# Patient Record
Sex: MALE | Race: WHITE | NOT HISPANIC OR LATINO | URBAN - METROPOLITAN AREA
[De-identification: names, ages, dates, MRNs, and addresses within clinical notes are randomized per-mention and may not be internally consistent; named-entity substitution may affect disease eponyms.]

---

## 2017-01-27 ENCOUNTER — EMERGENCY (EMERGENCY)
Facility: HOSPITAL | Age: 77
LOS: 1 days | Discharge: HOME | End: 2017-01-27
Attending: EMERGENCY MEDICINE | Admitting: EMERGENCY MEDICINE
Payer: MEDICARE

## 2017-01-27 VITALS
RESPIRATION RATE: 18 BRPM | OXYGEN SATURATION: 98 % | SYSTOLIC BLOOD PRESSURE: 132 MMHG | DIASTOLIC BLOOD PRESSURE: 86 MMHG | HEART RATE: 76 BPM | TEMPERATURE: 99 F

## 2017-01-27 VITALS
SYSTOLIC BLOOD PRESSURE: 139 MMHG | DIASTOLIC BLOOD PRESSURE: 78 MMHG | OXYGEN SATURATION: 98 % | HEART RATE: 69 BPM | RESPIRATION RATE: 16 BRPM

## 2017-01-27 DIAGNOSIS — S01.01XA LACERATION WITHOUT FOREIGN BODY OF SCALP, INITIAL ENCOUNTER: ICD-10-CM

## 2017-01-27 LAB
ALBUMIN SERPL ELPH-MCNC: 4.4 G/DL — SIGNIFICANT CHANGE UP (ref 3.3–5)
ALP SERPL-CCNC: 108 U/L — SIGNIFICANT CHANGE UP (ref 40–120)
ALT FLD-CCNC: 28 U/L RC — SIGNIFICANT CHANGE UP (ref 10–45)
ANION GAP SERPL CALC-SCNC: 10 MMOL/L — SIGNIFICANT CHANGE UP (ref 5–17)
ANION GAP SERPL CALC-SCNC: 16 MMOL/L — SIGNIFICANT CHANGE UP (ref 5–17)
APPEARANCE UR: CLEAR — SIGNIFICANT CHANGE UP
APTT BLD: 27.7 SEC — SIGNIFICANT CHANGE UP (ref 27.5–37.4)
AST SERPL-CCNC: 56 U/L — HIGH (ref 10–40)
BASOPHILS # BLD AUTO: 0 K/UL — SIGNIFICANT CHANGE UP (ref 0–0.2)
BASOPHILS NFR BLD AUTO: 0.5 % — SIGNIFICANT CHANGE UP (ref 0–2)
BILIRUB SERPL-MCNC: 0.6 MG/DL — SIGNIFICANT CHANGE UP (ref 0.2–1.2)
BILIRUB UR-MCNC: NEGATIVE — SIGNIFICANT CHANGE UP
BUN SERPL-MCNC: 15 MG/DL — SIGNIFICANT CHANGE UP (ref 7–23)
BUN SERPL-MCNC: 15 MG/DL — SIGNIFICANT CHANGE UP (ref 7–23)
CALCIUM SERPL-MCNC: 9 MG/DL — SIGNIFICANT CHANGE UP (ref 8.4–10.5)
CALCIUM SERPL-MCNC: 9.5 MG/DL — SIGNIFICANT CHANGE UP (ref 8.4–10.5)
CHLORIDE SERPL-SCNC: 103 MMOL/L — SIGNIFICANT CHANGE UP (ref 96–108)
CHLORIDE SERPL-SCNC: 106 MMOL/L — SIGNIFICANT CHANGE UP (ref 96–108)
CO2 SERPL-SCNC: 25 MMOL/L — SIGNIFICANT CHANGE UP (ref 22–31)
CO2 SERPL-SCNC: 29 MMOL/L — SIGNIFICANT CHANGE UP (ref 22–31)
COLOR SPEC: YELLOW — SIGNIFICANT CHANGE UP
CREAT SERPL-MCNC: 0.79 MG/DL — SIGNIFICANT CHANGE UP (ref 0.5–1.3)
CREAT SERPL-MCNC: 0.83 MG/DL — SIGNIFICANT CHANGE UP (ref 0.5–1.3)
DIFF PNL FLD: NEGATIVE — SIGNIFICANT CHANGE UP
EOSINOPHIL # BLD AUTO: 0.1 K/UL — SIGNIFICANT CHANGE UP (ref 0–0.5)
EOSINOPHIL NFR BLD AUTO: 1.1 % — SIGNIFICANT CHANGE UP (ref 0–6)
GAS PNL BLDV: SIGNIFICANT CHANGE UP
GLUCOSE SERPL-MCNC: 104 MG/DL — HIGH (ref 70–99)
GLUCOSE SERPL-MCNC: 72 MG/DL — SIGNIFICANT CHANGE UP (ref 70–99)
GLUCOSE UR QL: NEGATIVE — SIGNIFICANT CHANGE UP
HCT VFR BLD CALC: 42.4 % — SIGNIFICANT CHANGE UP (ref 39–50)
HGB BLD-MCNC: 14.4 G/DL — SIGNIFICANT CHANGE UP (ref 13–17)
INR BLD: 1.05 RATIO — SIGNIFICANT CHANGE UP (ref 0.88–1.16)
KETONES UR-MCNC: ABNORMAL
LEUKOCYTE ESTERASE UR-ACNC: NEGATIVE — SIGNIFICANT CHANGE UP
LYMPHOCYTES # BLD AUTO: 1.8 K/UL — SIGNIFICANT CHANGE UP (ref 1–3.3)
LYMPHOCYTES # BLD AUTO: 16.4 % — SIGNIFICANT CHANGE UP (ref 13–44)
MCHC RBC-ENTMCNC: 31.3 PG — SIGNIFICANT CHANGE UP (ref 27–34)
MCHC RBC-ENTMCNC: 33.9 GM/DL — SIGNIFICANT CHANGE UP (ref 32–36)
MCV RBC AUTO: 92.1 FL — SIGNIFICANT CHANGE UP (ref 80–100)
MONOCYTES # BLD AUTO: 0.8 K/UL — SIGNIFICANT CHANGE UP (ref 0–0.9)
MONOCYTES NFR BLD AUTO: 7.4 % — SIGNIFICANT CHANGE UP (ref 2–14)
NEUTROPHILS # BLD AUTO: 8 K/UL — HIGH (ref 1.8–7.4)
NEUTROPHILS NFR BLD AUTO: 74.6 % — SIGNIFICANT CHANGE UP (ref 43–77)
NITRITE UR-MCNC: NEGATIVE — SIGNIFICANT CHANGE UP
PH UR: 5.5 — SIGNIFICANT CHANGE UP (ref 4.8–8)
PLATELET # BLD AUTO: 165 K/UL — SIGNIFICANT CHANGE UP (ref 150–400)
POTASSIUM SERPL-MCNC: 3.5 MMOL/L — SIGNIFICANT CHANGE UP (ref 3.5–5.3)
POTASSIUM SERPL-MCNC: 5.5 MMOL/L — HIGH (ref 3.5–5.3)
POTASSIUM SERPL-SCNC: 3.5 MMOL/L — SIGNIFICANT CHANGE UP (ref 3.5–5.3)
POTASSIUM SERPL-SCNC: 5.5 MMOL/L — HIGH (ref 3.5–5.3)
PROT SERPL-MCNC: 7.5 G/DL — SIGNIFICANT CHANGE UP (ref 6–8.3)
PROT UR-MCNC: SIGNIFICANT CHANGE UP
PROTHROM AB SERPL-ACNC: 11.3 SEC — SIGNIFICANT CHANGE UP (ref 10–13.1)
RBC # BLD: 4.61 M/UL — SIGNIFICANT CHANGE UP (ref 4.2–5.8)
RBC # FLD: 12.5 % — SIGNIFICANT CHANGE UP (ref 10.3–14.5)
RBC CASTS # UR COMP ASSIST: SIGNIFICANT CHANGE UP /HPF (ref 0–2)
SODIUM SERPL-SCNC: 144 MMOL/L — SIGNIFICANT CHANGE UP (ref 135–145)
SODIUM SERPL-SCNC: 145 MMOL/L — SIGNIFICANT CHANGE UP (ref 135–145)
SP GR SPEC: 1.03 — HIGH (ref 1.01–1.02)
UROBILINOGEN FLD QL: NEGATIVE — SIGNIFICANT CHANGE UP
WBC # BLD: 10.7 K/UL — HIGH (ref 3.8–10.5)
WBC # FLD AUTO: 10.7 K/UL — HIGH (ref 3.8–10.5)
WBC UR QL: SIGNIFICANT CHANGE UP /HPF (ref 0–5)

## 2017-01-27 PROCEDURE — 71010: CPT | Mod: 26

## 2017-01-27 PROCEDURE — 70450 CT HEAD/BRAIN W/O DYE: CPT

## 2017-01-27 PROCEDURE — 82947 ASSAY GLUCOSE BLOOD QUANT: CPT

## 2017-01-27 PROCEDURE — 99284 EMERGENCY DEPT VISIT MOD MDM: CPT | Mod: 25

## 2017-01-27 PROCEDURE — 99284 EMERGENCY DEPT VISIT MOD MDM: CPT | Mod: GC

## 2017-01-27 PROCEDURE — 82803 BLOOD GASES ANY COMBINATION: CPT

## 2017-01-27 PROCEDURE — 82330 ASSAY OF CALCIUM: CPT

## 2017-01-27 PROCEDURE — 72170 X-RAY EXAM OF PELVIS: CPT

## 2017-01-27 PROCEDURE — 71045 X-RAY EXAM CHEST 1 VIEW: CPT

## 2017-01-27 PROCEDURE — 70450 CT HEAD/BRAIN W/O DYE: CPT | Mod: 26

## 2017-01-27 PROCEDURE — 72170 X-RAY EXAM OF PELVIS: CPT | Mod: 26

## 2017-01-27 PROCEDURE — 80048 BASIC METABOLIC PNL TOTAL CA: CPT

## 2017-01-27 PROCEDURE — 82435 ASSAY OF BLOOD CHLORIDE: CPT

## 2017-01-27 PROCEDURE — 84295 ASSAY OF SERUM SODIUM: CPT

## 2017-01-27 PROCEDURE — 80053 COMPREHEN METABOLIC PANEL: CPT

## 2017-01-27 PROCEDURE — 85027 COMPLETE CBC AUTOMATED: CPT

## 2017-01-27 PROCEDURE — 81001 URINALYSIS AUTO W/SCOPE: CPT

## 2017-01-27 PROCEDURE — 84132 ASSAY OF SERUM POTASSIUM: CPT

## 2017-01-27 PROCEDURE — 85730 THROMBOPLASTIN TIME PARTIAL: CPT

## 2017-01-27 PROCEDURE — 82550 ASSAY OF CK (CPK): CPT

## 2017-01-27 PROCEDURE — 83605 ASSAY OF LACTIC ACID: CPT

## 2017-01-27 PROCEDURE — 85610 PROTHROMBIN TIME: CPT

## 2017-01-27 PROCEDURE — 82553 CREATINE MB FRACTION: CPT

## 2017-01-27 PROCEDURE — 84484 ASSAY OF TROPONIN QUANT: CPT

## 2017-01-27 PROCEDURE — 85014 HEMATOCRIT: CPT

## 2017-01-27 RX ORDER — TETANUS TOXOID, REDUCED DIPHTHERIA TOXOID AND ACELLULAR PERTUSSIS VACCINE, ADSORBED 5; 2.5; 8; 8; 2.5 [IU]/.5ML; [IU]/.5ML; UG/.5ML; UG/.5ML; UG/.5ML
0.5 SUSPENSION INTRAMUSCULAR ONCE
Qty: 0 | Refills: 0 | Status: COMPLETED | OUTPATIENT
Start: 2017-01-27 | End: 2017-01-27

## 2017-01-27 NOTE — ED PROVIDER NOTE - MEDICAL DECISION MAKING DETAILS
Neal resident: 78 y/o with parkinsons not on AC p/w fall at 10 am this morning - small 1cm laceration on posterior head - no indication for repair as not bleeding - patient was adament about not getting CT as worried about radiation - was able to convince - patient also fell when tying to get up when no one was in room - states he fell onto buttocks - no pain - will CT head, check labs, and reassess social situation as patient is clearly unstable and unable to walk 2/2 to 2 falls today eNal resident: 78 y/o with parkinsons not on AC p/w fall at 10 am this morning - small 1cm laceration on posterior head - no indication for repair as not bleeding - patient was adament about not getting CT as worried about radiation - was able to convince - patient also fell when tying to get up when no one was in room - states he fell onto buttocks - no pain - will CT head, check labs, and reassess social situation as patient is clearly unstable and unable to walk 2/2 to 2 falls today  ATTD: fall with head injury, concern for trauma, check ct head, concern for recent decrease in activity secondary to infection  / electrolyte abnormalities. will check labs, and urine and re eval for dispo.

## 2017-01-27 NOTE — ED ADULT NURSE REASSESSMENT NOTE - NS ED NURSE REASSESS COMMENT FT1
EMS left without report.
Incident report filled out due to patient found on ground in room 36.  Patient was left in red 36 briefly while wife stepped out to get food. RN and MD had already worked up the patient. Pt was educated on the importance of staying in bed and asking for help if he needed to urinate. Patient is A&Ox4. Call bell was near by. Patient is in ED for fall at home. No LOC, no obvious injuries. MD aware. Pt found sitting on the floor awake and alert. Then patient was taken to CT.    Incident report given to ED manager.

## 2017-01-27 NOTE — ED PROVIDER NOTE - CRANIAL NERVE AND PUPILLARY EXAM
essential resting tremor of upper extremities - mild dysmetria of both upper exdtremities - sensation intact over all 4 extremities/cranial nerves 2-12 intact

## 2017-01-27 NOTE — ED PROVIDER NOTE - OBJECTIVE STATEMENT
76 y/o male with PMH of of Parkinsons. Per patient, patient fell in the bedroom this morning at 10 an. Slipped and fell onto wooden floor and hit head. Was unable to get up on his own. No LOC. Went to PT today who would not allow patient to participate unless he was cleared by an MD. Currently feels well. Denies any HA, vision changes, Cp, SOB, N/V/D, numbness, paresthesias. Per wife, patient has had an abrupt change in his activity over past few days - has had worsening balances. Patient agrees that he has been unsteady, and feels that his legs are like "lead," and this morning was the worst hes ever experienced.   PMD: Dr. Pa Wells

## 2017-01-27 NOTE — ED ADULT NURSE NOTE - OBJECTIVE STATEMENT
77 yr old male brought in by ambulance from PT s/p unwitnessed fall at home (but heard by wife) prior to going to PT. Patient states he has Parkinson's- denies taking meds for it. States he has been feeling weaker the past few days; walks with a cane. Patient had a fall at home, No LOC, small abrasion noted back of head. Unknown if Tetanus is up-to-date. Patient denies headache, dizziness, chest pain, sob, cough, fevers, chills, n/v/d. Patient remains on a regular diet at home and denies decrease in appetite. No hematuria or dysuria/burning with urination. +PERRL, clear speech. Patient was able to get back up with assistance from wife. 77 yr old male brought in by ambulance from PT s/p unwitnessed fall at home (but heard by wife) prior to going to PT. Patient states he has Parkinson's- denies taking meds for it. States he has been feeling weaker the past few days; walks with a cane. Patient had a fall at home, No LOC, small abrasion noted back of head. Unknown if Tetanus is up-to-date. Patient denies headache, dizziness, chest pain, sob, cough, fevers, chills, n/v/d. Patient remains on a regular diet at home and denies decrease in appetite. No hematuria or dysuria/burning with urination. +PERRL, clear speech. Patient was able to get back up with assistance from wife. Patient also states "I have felt this way before because of my Parkinson's".

## 2017-01-27 NOTE — ED PROVIDER NOTE - ATTENDING CONTRIBUTION TO CARE
78 y/o male with pmhx of Parkinsons and, kidney stones, presents for concern of fall earlier today and needs clearance for physical therapy. Per wife and patient, he fell in bedroom this morning at around 10 am  kavita sliding on hardwood floor. mild head trauma. no LOC. Denies any HA, vision changes. Had no pre or post chest pain. no shortness of breath. no dizziness. Wife concerned that he had less energy than usual.   PMD: Dr. Pa Wells  Gen. no acute distress, Non toxic   HEENT: EOMI, mmm, pupils 3 mm reactive to light b/l. small area of ecchymosis on occipital area of head. no active bleeding. superficial laceration. no step off. no racoon no elkins sign. no hemotympanum. supple neck / c spine.   Lungs: CTAB/L no C/ W /R   CVS: S1S2   Abd; Soft non tender, non distended   Ext: no edema   Neuro Awake, alert, oriented to person / place. + tremor. mild dysmetria of both upper exdtremities - sensation intact over all 4 extremities

## 2017-01-27 NOTE — ED PROVIDER NOTE - PROGRESS NOTE DETAILS
patient fell out of bed trying to get into the bathroom - unwitnessed - fell onto buttocks - states he did not hit head - was instructed not to get up as wife went to get patient food by nursing - reports no pain neuro to see

## 2017-06-19 ENCOUNTER — EMERGENCY (EMERGENCY)
Facility: HOSPITAL | Age: 77
LOS: 1 days | Discharge: LEFT BEFORE DISCHARGE | End: 2017-06-19
Attending: EMERGENCY MEDICINE | Admitting: EMERGENCY MEDICINE
Payer: MEDICARE

## 2017-06-19 VITALS
TEMPERATURE: 99 F | HEART RATE: 75 BPM | DIASTOLIC BLOOD PRESSURE: 77 MMHG | RESPIRATION RATE: 20 BRPM | OXYGEN SATURATION: 98 % | SYSTOLIC BLOOD PRESSURE: 136 MMHG

## 2017-06-19 VITALS — HEART RATE: 92 BPM | SYSTOLIC BLOOD PRESSURE: 135 MMHG | DIASTOLIC BLOOD PRESSURE: 73 MMHG

## 2017-06-19 LAB
ALBUMIN SERPL ELPH-MCNC: 3.9 G/DL — SIGNIFICANT CHANGE UP (ref 3.3–5)
ALP SERPL-CCNC: 93 U/L — SIGNIFICANT CHANGE UP (ref 40–120)
ALT FLD-CCNC: 13 U/L RC — SIGNIFICANT CHANGE UP (ref 10–45)
ANION GAP SERPL CALC-SCNC: 12 MMOL/L — SIGNIFICANT CHANGE UP (ref 5–17)
APPEARANCE UR: CLEAR — SIGNIFICANT CHANGE UP
AST SERPL-CCNC: 21 U/L — SIGNIFICANT CHANGE UP (ref 10–40)
BASOPHILS # BLD AUTO: 0 K/UL — SIGNIFICANT CHANGE UP (ref 0–0.2)
BASOPHILS NFR BLD AUTO: 0.1 % — SIGNIFICANT CHANGE UP (ref 0–2)
BILIRUB SERPL-MCNC: 0.6 MG/DL — SIGNIFICANT CHANGE UP (ref 0.2–1.2)
BILIRUB UR-MCNC: NEGATIVE — SIGNIFICANT CHANGE UP
BUN SERPL-MCNC: 14 MG/DL — SIGNIFICANT CHANGE UP (ref 7–23)
CALCIUM SERPL-MCNC: 9.2 MG/DL — SIGNIFICANT CHANGE UP (ref 8.4–10.5)
CHLORIDE SERPL-SCNC: 104 MMOL/L — SIGNIFICANT CHANGE UP (ref 96–108)
CO2 SERPL-SCNC: 28 MMOL/L — SIGNIFICANT CHANGE UP (ref 22–31)
COLOR SPEC: YELLOW — SIGNIFICANT CHANGE UP
CREAT SERPL-MCNC: 0.94 MG/DL — SIGNIFICANT CHANGE UP (ref 0.5–1.3)
DIFF PNL FLD: NEGATIVE — SIGNIFICANT CHANGE UP
EOSINOPHIL # BLD AUTO: 0.1 K/UL — SIGNIFICANT CHANGE UP (ref 0–0.5)
EOSINOPHIL NFR BLD AUTO: 0.7 % — SIGNIFICANT CHANGE UP (ref 0–6)
GAS PNL BLDV: SIGNIFICANT CHANGE UP
GLUCOSE SERPL-MCNC: 111 MG/DL — HIGH (ref 70–99)
GLUCOSE UR QL: NEGATIVE — SIGNIFICANT CHANGE UP
HCT VFR BLD CALC: 40.1 % — SIGNIFICANT CHANGE UP (ref 39–50)
HGB BLD-MCNC: 13.7 G/DL — SIGNIFICANT CHANGE UP (ref 13–17)
INR BLD: 1.08 RATIO — SIGNIFICANT CHANGE UP (ref 0.88–1.16)
KETONES UR-MCNC: NEGATIVE — SIGNIFICANT CHANGE UP
LEUKOCYTE ESTERASE UR-ACNC: NEGATIVE — SIGNIFICANT CHANGE UP
LYMPHOCYTES # BLD AUTO: 1 K/UL — SIGNIFICANT CHANGE UP (ref 1–3.3)
LYMPHOCYTES # BLD AUTO: 12.5 % — LOW (ref 13–44)
MCHC RBC-ENTMCNC: 32.2 PG — SIGNIFICANT CHANGE UP (ref 27–34)
MCHC RBC-ENTMCNC: 34.2 GM/DL — SIGNIFICANT CHANGE UP (ref 32–36)
MCV RBC AUTO: 94.1 FL — SIGNIFICANT CHANGE UP (ref 80–100)
MONOCYTES # BLD AUTO: 0.5 K/UL — SIGNIFICANT CHANGE UP (ref 0–0.9)
MONOCYTES NFR BLD AUTO: 6.1 % — SIGNIFICANT CHANGE UP (ref 2–14)
NEUTROPHILS # BLD AUTO: 6.5 K/UL — SIGNIFICANT CHANGE UP (ref 1.8–7.4)
NEUTROPHILS NFR BLD AUTO: 80.7 % — HIGH (ref 43–77)
NITRITE UR-MCNC: NEGATIVE — SIGNIFICANT CHANGE UP
PH UR: 5.5 — SIGNIFICANT CHANGE UP (ref 5–8)
PLATELET # BLD AUTO: 161 K/UL — SIGNIFICANT CHANGE UP (ref 150–400)
POTASSIUM SERPL-MCNC: 4.1 MMOL/L — SIGNIFICANT CHANGE UP (ref 3.5–5.3)
POTASSIUM SERPL-SCNC: 4.1 MMOL/L — SIGNIFICANT CHANGE UP (ref 3.5–5.3)
PROT SERPL-MCNC: 7 G/DL — SIGNIFICANT CHANGE UP (ref 6–8.3)
PROT UR-MCNC: NEGATIVE — SIGNIFICANT CHANGE UP
PROTHROM AB SERPL-ACNC: 11.7 SEC — SIGNIFICANT CHANGE UP (ref 9.8–12.7)
RBC # BLD: 4.26 M/UL — SIGNIFICANT CHANGE UP (ref 4.2–5.8)
RBC # FLD: 11.8 % — SIGNIFICANT CHANGE UP (ref 10.3–14.5)
SODIUM SERPL-SCNC: 144 MMOL/L — SIGNIFICANT CHANGE UP (ref 135–145)
SP GR SPEC: 1.02 — SIGNIFICANT CHANGE UP (ref 1.01–1.02)
UROBILINOGEN FLD QL: NEGATIVE — SIGNIFICANT CHANGE UP
WBC # BLD: 8.1 K/UL — SIGNIFICANT CHANGE UP (ref 3.8–10.5)
WBC # FLD AUTO: 8.1 K/UL — SIGNIFICANT CHANGE UP (ref 3.8–10.5)
WBC UR QL: SIGNIFICANT CHANGE UP /HPF (ref 0–5)

## 2017-06-19 PROCEDURE — 99284 EMERGENCY DEPT VISIT MOD MDM: CPT | Mod: 25

## 2017-06-19 PROCEDURE — 82330 ASSAY OF CALCIUM: CPT

## 2017-06-19 PROCEDURE — 82435 ASSAY OF BLOOD CHLORIDE: CPT

## 2017-06-19 PROCEDURE — 82553 CREATINE MB FRACTION: CPT

## 2017-06-19 PROCEDURE — 82803 BLOOD GASES ANY COMBINATION: CPT

## 2017-06-19 PROCEDURE — 81001 URINALYSIS AUTO W/SCOPE: CPT

## 2017-06-19 PROCEDURE — 85610 PROTHROMBIN TIME: CPT

## 2017-06-19 PROCEDURE — 80053 COMPREHEN METABOLIC PANEL: CPT

## 2017-06-19 PROCEDURE — 70450 CT HEAD/BRAIN W/O DYE: CPT

## 2017-06-19 PROCEDURE — 99284 EMERGENCY DEPT VISIT MOD MDM: CPT | Mod: GC

## 2017-06-19 PROCEDURE — 70450 CT HEAD/BRAIN W/O DYE: CPT | Mod: 26

## 2017-06-19 PROCEDURE — 72125 CT NECK SPINE W/O DYE: CPT

## 2017-06-19 PROCEDURE — 85014 HEMATOCRIT: CPT

## 2017-06-19 PROCEDURE — 84484 ASSAY OF TROPONIN QUANT: CPT

## 2017-06-19 PROCEDURE — 82947 ASSAY GLUCOSE BLOOD QUANT: CPT

## 2017-06-19 PROCEDURE — 83605 ASSAY OF LACTIC ACID: CPT

## 2017-06-19 PROCEDURE — 82550 ASSAY OF CK (CPK): CPT

## 2017-06-19 PROCEDURE — 84295 ASSAY OF SERUM SODIUM: CPT

## 2017-06-19 PROCEDURE — 72125 CT NECK SPINE W/O DYE: CPT | Mod: 26

## 2017-06-19 PROCEDURE — 85027 COMPLETE CBC AUTOMATED: CPT

## 2017-06-19 PROCEDURE — 71010: CPT | Mod: 26

## 2017-06-19 PROCEDURE — 71045 X-RAY EXAM CHEST 1 VIEW: CPT

## 2017-06-19 PROCEDURE — 84132 ASSAY OF SERUM POTASSIUM: CPT

## 2017-06-19 RX ORDER — TETANUS TOXOID, REDUCED DIPHTHERIA TOXOID AND ACELLULAR PERTUSSIS VACCINE, ADSORBED 5; 2.5; 8; 8; 2.5 [IU]/.5ML; [IU]/.5ML; UG/.5ML; UG/.5ML; UG/.5ML
0.5 SUSPENSION INTRAMUSCULAR ONCE
Qty: 0 | Refills: 0 | Status: COMPLETED | OUTPATIENT
Start: 2017-06-19 | End: 2017-06-19

## 2017-06-19 NOTE — ED ADULT NURSE NOTE - OBJECTIVE STATEMENT
1040 77 yr old WM brought to ER via ambulance on stretcher for further eval and tx or syncopal episode. c/o feeling dizzy and not feeling well after eating breakfast. Wife heard a thump in other room and found  on floor in bedroom. Hx of parkinsons with Stem Cell tx in past. Usually has difficulty walking and is slightly confused. More confused today after episode of syncope. Denies chest pain, palp or SOB. color pink. skin W&D. Laceration near right eyebrow. minimal bleeding at present

## 2017-06-19 NOTE — ED PROVIDER NOTE - OBJECTIVE STATEMENT
77 year old male, past medical history parkinsons, who presents to the ED for fall about 1 hour prior to arrival. had unwitnessed fall, was down for about 2 minutes as per wife who heard patient fall. Patient was snoring at that time. Regained consciousness as now back to his baseline. No chest pain, shortness of breath, dizziness, lightheadedness. No recent fevers, chills, nausea, vomiting, diarrhea, constipation. No cough, congestion. Has increasing weakness and fatigue. Does not take medications. Has laceration over right eyebrow. No sick contacts or recent travel.     Dr. Shravan Wells ([facility])

## 2017-06-19 NOTE — ED PROVIDER NOTE - PLAN OF CARE
1) Drink plenty of fluids   2) Take Tylenol 325mg tablet, take 2 tablets every 6-8 hours as needed for pain   3) Keep laceration clean and dry for 24 hours, you can wash wound with soap and water after 24 hours, do not swim   4) Please follow up with your primary medical doctor in 1-2 days for reevaluation. If you do not have pmd please call the general medicine clinic for an appointment at [phone].   5) You were given a copy of your results, please show them to your doctor for review.   6) Return to the ED for worsening pain, headache, nausea, vomiting, fever greater than 100.4, redness around wound, pus coming from wound, chest pain, shortness of breath, abdominal pain, faint again, fall, or if you have any other new, worsening, or concerning symptoms.

## 2017-06-19 NOTE — ED ADULT NURSE NOTE - ED STAT RN HANDOFF DETAILS
hand off given to oncoming RNs Baldomero Puente and Shannon Quick. Awaiting urine sample for UA. Laceration was cleansed and sutured earlier by . Pt awake and alert. Ate lunch. wife at [facility]

## 2017-06-19 NOTE — ED PROVIDER NOTE - PROGRESS NOTE DETAILS
Offered admission for syncope, wife and patient declined admission, will see primary medical doctor in 1-2 days. Offered advanced testing, monitoring labs. patient and wife understand risks and benfits, not limited to repeat syncope, cardiac disease and death. The patient is leaving against my medical advice. I have informed the patient and wife about the risks, benefits, and alternatives to the evaluation and treatment of their condition. The patient and wife are aware of the potential for self harm by this decision including injury, permanent disability, or even death. The patient reports understanding of these issues and has the capacity and insight to make important medical decisions. The necessity to follow up with PMD/Clinic/follow up provided within 2-3 days was explained. The patient understands that this decision to go against medical advice can be changed at any time and the patient can return for re-evaluation and further treatment with any changes in condition or concerns.  The patient understands all the reasons to return to the Emergency Department, including any concerns at all, the patient reports understanding of above with capacity and insight.

## 2017-06-19 NOTE — ED PROVIDER NOTE - ATTENDING CONTRIBUTION TO CARE
pt is a 78 y/o male with syncopal episode with r eyebrow laceration noted, from of hips and arms, no other signs of trauma. no h/o syncope likely admissoin, suture, ct head/cspine.

## 2017-06-19 NOTE — ED PROVIDER NOTE - CARE PLAN
Principal Discharge DX:	Syncope  Instructions for follow-up, activity and diet:	1) Drink plenty of fluids   2) Take Tylenol 325mg tablet, take 2 tablets every 6-8 hours as needed for pain   3) Keep laceration clean and dry for 24 hours, you can wash wound with soap and water after 24 hours, do not swim   4) Please follow up with your primary medical doctor in 1-2 days for reevaluation. If you do not have pmd please call the general medicine clinic for an appointment at [phone].   5) You were given a copy of your results, please show them to your doctor for review.   6) Return to the ED for worsening pain, headache, nausea, vomiting, fever greater than 100.4, redness around wound, pus coming from wound, chest pain, shortness of breath, abdominal pain, faint again, fall, or if you have any other new, worsening, or concerning symptoms.

## 2017-06-20 ENCOUNTER — INPATIENT (INPATIENT)
Facility: HOSPITAL | Age: 77
LOS: 3 days | Discharge: NURSING, REHAB OR LONG-TERM CARE | End: 2017-06-24
Attending: HOSPITALIST | Admitting: HOSPITALIST
Payer: MEDICARE

## 2017-06-20 VITALS
RESPIRATION RATE: 16 BRPM | OXYGEN SATURATION: 98 % | DIASTOLIC BLOOD PRESSURE: 68 MMHG | SYSTOLIC BLOOD PRESSURE: 119 MMHG | TEMPERATURE: 98 F | HEART RATE: 70 BPM

## 2017-06-20 DIAGNOSIS — G91.2 (IDIOPATHIC) NORMAL PRESSURE HYDROCEPHALUS: ICD-10-CM

## 2017-06-20 DIAGNOSIS — Z29.9 ENCOUNTER FOR PROPHYLACTIC MEASURES, UNSPECIFIED: ICD-10-CM

## 2017-06-20 DIAGNOSIS — R41.82 ALTERED MENTAL STATUS, UNSPECIFIED: ICD-10-CM

## 2017-06-20 DIAGNOSIS — G20 PARKINSON'S DISEASE: ICD-10-CM

## 2017-06-20 DIAGNOSIS — R74.0 NONSPECIFIC ELEVATION OF LEVELS OF TRANSAMINASE AND LACTIC ACID DEHYDROGENASE [LDH]: ICD-10-CM

## 2017-06-20 LAB
ALBUMIN SERPL ELPH-MCNC: 4 G/DL — SIGNIFICANT CHANGE UP (ref 3.3–5)
ALP SERPL-CCNC: 98 U/L — SIGNIFICANT CHANGE UP (ref 40–120)
ALT FLD-CCNC: 29 U/L — SIGNIFICANT CHANGE UP (ref 4–41)
AST SERPL-CCNC: 113 U/L — HIGH (ref 4–40)
BILIRUB SERPL-MCNC: 1 MG/DL — SIGNIFICANT CHANGE UP (ref 0.2–1.2)
BUN SERPL-MCNC: 10 MG/DL — SIGNIFICANT CHANGE UP (ref 7–23)
CALCIUM SERPL-MCNC: 9.3 MG/DL — SIGNIFICANT CHANGE UP (ref 8.4–10.5)
CHLORIDE SERPL-SCNC: 104 MMOL/L — SIGNIFICANT CHANGE UP (ref 98–107)
CLARITY CSF: CLEAR — SIGNIFICANT CHANGE UP
CO2 SERPL-SCNC: 27 MMOL/L — SIGNIFICANT CHANGE UP (ref 22–31)
COLOR CSF: COLORLESS — SIGNIFICANT CHANGE UP
CREAT SERPL-MCNC: 0.92 MG/DL — SIGNIFICANT CHANGE UP (ref 0.5–1.3)
GLUCOSE CSF-MCNC: 59 MG/DL — SIGNIFICANT CHANGE UP (ref 40–70)
GLUCOSE SERPL-MCNC: 86 MG/DL — SIGNIFICANT CHANGE UP (ref 70–99)
GRAM STN CSF: SIGNIFICANT CHANGE UP
HCT VFR BLD CALC: 40.4 % — SIGNIFICANT CHANGE UP (ref 39–50)
HGB BLD-MCNC: 13.5 G/DL — SIGNIFICANT CHANGE UP (ref 13–17)
LYMPHOCYTES # CSF: 28 % — SIGNIFICANT CHANGE UP
MCHC RBC-ENTMCNC: 30.7 PG — SIGNIFICANT CHANGE UP (ref 27–34)
MCHC RBC-ENTMCNC: 33.4 % — SIGNIFICANT CHANGE UP (ref 32–36)
MCV RBC AUTO: 91.8 FL — SIGNIFICANT CHANGE UP (ref 80–100)
MONOCYTES # CSF: 64 % — SIGNIFICANT CHANGE UP
NEUTS SEG NFR CSF MANUAL: 8 % — SIGNIFICANT CHANGE UP
NRBC NFR CSF: 2 CELL/UL — SIGNIFICANT CHANGE UP (ref 0–5)
PLATELET # BLD AUTO: 177 K/UL — SIGNIFICANT CHANGE UP (ref 150–400)
PMV BLD: 11.6 FL — SIGNIFICANT CHANGE UP (ref 7–13)
POTASSIUM SERPL-MCNC: 3.9 MMOL/L — SIGNIFICANT CHANGE UP (ref 3.5–5.3)
POTASSIUM SERPL-SCNC: 3.9 MMOL/L — SIGNIFICANT CHANGE UP (ref 3.5–5.3)
PROT CSF-MCNC: 71.8 MG/DL — HIGH (ref 15–45)
PROT SERPL-MCNC: 7 G/DL — SIGNIFICANT CHANGE UP (ref 6–8.3)
RBC # BLD: 4.4 M/UL — SIGNIFICANT CHANGE UP (ref 4.2–5.8)
RBC # CSF: 335 CELL/UL — HIGH (ref 0–0)
RBC # FLD: 13 % — SIGNIFICANT CHANGE UP (ref 10.3–14.5)
SODIUM SERPL-SCNC: 145 MMOL/L — SIGNIFICANT CHANGE UP (ref 135–145)
SPECIMEN SOURCE: SIGNIFICANT CHANGE UP
TOTAL CELLS COUNTED, SPINAL FLUID: 25 CELLS — SIGNIFICANT CHANGE UP
TROPONIN T SERPL-MCNC: < 0.06 NG/ML — SIGNIFICANT CHANGE UP (ref 0–0.06)
VIT B12 SERPL-MCNC: 2000 PG/ML — HIGH (ref 200–900)
WBC # BLD: 9.42 K/UL — SIGNIFICANT CHANGE UP (ref 3.8–10.5)
WBC # FLD AUTO: 9.42 K/UL — SIGNIFICANT CHANGE UP (ref 3.8–10.5)
XANTHOCHROMIA: SIGNIFICANT CHANGE UP

## 2017-06-20 PROCEDURE — 70450 CT HEAD/BRAIN W/O DYE: CPT | Mod: 26

## 2017-06-20 PROCEDURE — 71010: CPT | Mod: 26

## 2017-06-20 PROCEDURE — 99223 1ST HOSP IP/OBS HIGH 75: CPT | Mod: GC

## 2017-06-20 RX ORDER — ACETAMINOPHEN 500 MG
650 TABLET ORAL EVERY 6 HOURS
Qty: 0 | Refills: 0 | Status: DISCONTINUED | OUTPATIENT
Start: 2017-06-20 | End: 2017-06-24

## 2017-06-20 RX ORDER — SODIUM CHLORIDE 9 MG/ML
1000 INJECTION, SOLUTION INTRAVENOUS
Qty: 0 | Refills: 0 | Status: DISCONTINUED | OUTPATIENT
Start: 2017-06-20 | End: 2017-06-22

## 2017-06-20 NOTE — PATIENT PROFILE ADULT. - --DESCRIBE SURGICAL SITE
Right temporal area 6 stitches. Steri strips dry and intact Right eyebrow 6 stitches. Steri strips dry and intact

## 2017-06-20 NOTE — H&P ADULT - NSHPREVIEWOFSYSTEMS_GEN_ALL_CORE
CONSTITUTIONAL: +weakness. No fevers or chills  EYES/ENT: No visual changes;  No vertigo or throat pain   NECK: No pain or stiffness  RESPIRATORY: No cough, wheezing, hemoptysis; No shortness of breath  CARDIOVASCULAR: No chest pain or palpitations. + syncope and fall with LOC yesterday  GASTROINTESTINAL: No abdominal or epigastric pain. No nausea, vomiting, or hematemesis; No diarrhea or constipation. No melena or hematochezia.  GENITOURINARY: No dysuria, frequency or hematuria  NEUROLOGICAL: No numbness or weakness  SKIN: No itching, burning, rashes, or lesions   All other review of systems is negative unless indicated above.

## 2017-06-20 NOTE — H&P ADULT - HISTORY OF PRESENT ILLNESS
77M [facility] Parkinson's disease (s/p stem cell transplant currently in an FDA trial) presented with agitation, visual hallucinations, urinary incontinence and a fall. Of note, patient was seen at [facility] on 6/19/2017 for syncope, offered admission for syncope; however, left AMA. When patient was at home overnight he was having urinary incontinence, agitation, and was unable to walk.  Patient was noted to be agitated overnight with visual hallucinations, and generalized weakness.  Wife noted that she was unable to move him this morning. Family called neurologist who referred the patient to the ER for a lumbar puncture and evaluation by neurology and neurosurgery.       In ED T: 100.1  BP: 119/68  HR: 70  RR: 16  SpO2: 98%RA. Patient provided CThead demonstrating no intracranial derangements. BCx taken and Neurology consulted, performed LP with 6cc removed, opening pressure noted to be [id number].

## 2017-06-20 NOTE — H&P ADULT - PROBLEM SELECTOR PLAN 4
Patient s/p LP. Will hold of on DVT p/px with pharmaceuticals for now. SCD's until AM  - Fall risk precautions  - Aspiration risk precautions

## 2017-06-20 NOTE — H&P ADULT - PROBLEM SELECTOR PLAN 1
Patient with waxing and waning mental status typified by visual hallucinations, unsteady gait, urinary incontinence c/w NPH vs infection vs worsening parkinsons dementia  - s/p LP s/p 6cc's CSF removed with normal opening pressure  - f/u CSF cultures, BCx, check RVP  - No Abx for now  - trend fever curve  - LR at 100cc/hr standing for fluid maintainence  - f/u Neurology recommendations Patient with waxing and waning mental status typified by visual hallucinations, unsteady gait, urinary incontinence c/w NPH vs infection vs worsening parkinson's dementia  - s/p LP s/p 6cc's CSF removed with normal opening pressure  - f/u CSF cultures, BCx, check RVP  - No Abx for now  - trend fever curve  - LR at 100cc/hr standing for fluid maintenance  - f/u Neurology recommendations

## 2017-06-20 NOTE — ED ADULT NURSE REASSESSMENT NOTE - NS ED NURSE REASSESS COMMENT FT1
night shift - pt resting comfortably in bed, denies pain/discomfort at the present time.  Pt.'s wife remains at the bedside.  Pt. aaox2 at the present time, as per wife pt. becomes increasingly confused when he is tired/fatigued.  Vital signs stable. Pt. assigned a bed, awaiting to give report to the floor.

## 2017-06-20 NOTE — H&P ADULT - NSHPLABSRESULTS_GEN_ALL_CORE
LABS:                          13.5   9.42  )-----------( 177      ( 20 Jun 2017 18:10 )             40.4       06-20    145  |  104  |  10  ----------------------------<  86  3.9   |  27  |  0.92    Ca    9.3      20 Jun 2017 18:10    TPro  7.0  /  Alb  4.0  /  TBili  1.0  /  DBili  x   /  AST  113<H>  /  ALT  29  /  AlkPhos  98  06-20      Creatine Kinase, Serum (06.19.17 @ 11:09)    Creatine Kinase, Serum: 109 U/L  Troponin T, Serum (06.20.17 @ 18:10)    Troponin T, Serum: < 0.06: INCLUDES THE 99th PERCENTILE OF A HEALTHY  POPULATION AT A  METHOD C.V. OF 10% OR LESS.    TROPONIN T IS MEASURED BY THE ROCHE ELECSYS ECLIA METHOD. ng/mL      Spinal Fluid Differential (06.20.17 @ 19:48)    Seg Count, CSF: 8 %    Lymphocyte Count, CSF: 28 %  Cerebrospinal Fluid Cell Count-1 (06.20.17 @ 19:40)    CSF Clarity: CLEAR    CSF Color: COLORLESS    Total Nucleated Cell Count, CSF: 1 cell/uL  Protein, CSF (06.20.17 @ 19:48)    Protein, CSF: 71.8 mg/dL  Glucose, CSF (06.20.17 @ 19:48)    Glucose, CSF: 59 mg/dL      IMAGING:    CThead:   IMPRESSION:  No acute intracranial hemorrhage or midline shift. Prominent ventricular   size out of proportion to cerebral atrophy, with effacement of sulci near   the vertex suggestive of normal pressure hydrocephalus, unchanged from   prior. LABS:                          13.5   9.42  )-----------( 177      ( 20 Jun 2017 18:10 )             40.4       06-20    145  |  104  |  10  ----------------------------<  86  3.9   |  27  |  0.92    Ca    9.3      20 Jun 2017 18:10    TPro  7.0  /  Alb  4.0  /  TBili  1.0  /  DBili  x   /  AST  113<H>  /  ALT  29  /  AlkPhos  98  06-20      Creatine Kinase, Serum (06.19.17 @ 11:09)    Creatine Kinase, Serum: 109 U/L  Troponin T, Serum (06.20.17 @ 18:10)    Troponin T, Serum: < 0.06: INCLUDES THE 99th PERCENTILE OF A HEALTHY  POPULATION AT A  METHOD C.V. OF 10% OR LESS.    TROPONIN T IS MEASURED BY THE ROCHE ELECSYS ECLIA METHOD. ng/mL      Spinal Fluid Differential (06.20.17 @ 19:48)    Seg Count, CSF: 8 %    Lymphocyte Count, CSF: 28 %  Cerebrospinal Fluid Cell Count-1 (06.20.17 @ 19:40)    CSF Clarity: CLEAR    CSF Color: COLORLESS    Total Nucleated Cell Count, CSF: 1 cell/uL  Protein, CSF (06.20.17 @ 19:48)    Protein, CSF: 71.8 mg/dL  Glucose, CSF (06.20.17 @ 19:48)    Glucose, CSF: 59 mg/dL      IMAGING:    CThead:   IMPRESSION:  No acute intracranial hemorrhage or midline shift. Prominent ventricular   size out of proportion to cerebral atrophy, with effacement of sulci near   the vertex suggestive of normal pressure hydrocephalus, unchanged from   prior.    ECG: NSR at 70 BPM, LAE, LVH QTc 483 ms

## 2017-06-20 NOTE — ED PROVIDER NOTE - ATTENDING CONTRIBUTION TO CARE
DR Bloch- Patient examined, new onset confusion, unsteady walking and incontinence, Started yesterday, fell, seen at [place], signed out AMA, unable to walk, has Parkinsons with stem cell transplant 1 month ago.  alert flat affect, sutured wound right temple,  temp 100.1 rectal2-12 intact, heart sounds nml, mild rigidity, no tremor, motor 5/5. sensation intact. confused, doesn't remember episode yesterday, think its November.  CT yesterday showed NPH. Discussed with Dr Calderon , Neuro consult  LP,

## 2017-06-20 NOTE — H&P ADULT - NSHPPHYSICALEXAM_GEN_ALL_CORE
General: WN/WD NAD. Speaks in a soft voice.  Neurology: A&Ox3, nonfocal, ROBLES x 4  Eyes: PERRLA/ EOMI, Gross vision intact  ENT/Neck: Neck supple, trachea midline, No JVD, Gross hearing intact  Respiratory: CTA B/L, No wheezing, rales, rhonchi  CV: RRR, +S1/S2, -S3/S4. + 2/6 systolic murmur. No rubs or gallops  Abdominal: Soft, NT, ND +BS, No HSM  MSK: 5/5 strength UE/LE bilaterally. 3+ reflexes UE/LE bilaterally  Extremities: No edema, 2+ peripheral pulses  Skin: No Rashes, Hematoma, Ecchymosis

## 2017-06-20 NOTE — ED ADULT NURSE NOTE - OBJECTIVE STATEMENT
Patient brought to ER by family for increased change in mental status. Pt was at [facility] yesterday and left after tests but was noted to be incontinent and increased weakness during the night. Pt here for further testing and evaluation. IVL placed and labs drawn and sent.   Pt is in process of spinal tap. Consented and witnessed consent with family at bedside.    SHAINA Real

## 2017-06-20 NOTE — ED PROVIDER NOTE - OBJECTIVE STATEMENT
77 year old male HLD, parkinson's disease s/p stem cell transplant currently in an FDA trial, arrived after fall yesterday.  Patient was seen at [facility] with CT revealing no mass, shift acute bleed, CT neck no acute fracture, and laceration now repaired.  Patient and wife decided to leave after initial tests, however, when patient was at home overnight he was having urinary incontinence, agitation, and was unable to walk.  Patient was noted to be agitated overnight with visual hallucinations, and generalized weakness.  Wife noted that she was unable to move him this morning.      CT reviewed in PACs, noted to have dilated ventricles, reviewed note from Pa Wells noting diffuse weakness.

## 2017-06-20 NOTE — ED PROVIDER NOTE - CARE PLAN
Principal Discharge DX:	NPH (normal pressure hydrocephalus)  Secondary Diagnosis:	Altered mental status

## 2017-06-20 NOTE — CONSULT NOTE ADULT - PROBLEM SELECTOR RECOMMENDATION 9
-LP in ER - check opening pressure - remove 30-50 cc's of fluid and then reassess patient  Would send off CSF to r/o infection - cell count, protein, glucose, viral panel including HSV  -If no improvement with LP then will consider trial of Sinemet or other parkinsons meds  -PT/OT  -Neurochecks  -Urinalysis -LP in ER - check opening pressure - remove 30-50 cc's of fluid and then reassess patient  -MRI brain with and without  Would send off CSF to r/o infection - cell count, protein, glucose, viral panel including HSV  -If no improvement with LP then will consider trial of Sinemet or other parkinsons meds  -PT/OT  -Neurochecks  -Urinalysis -LP in ER - check opening pressure - remove 30-50 cc's of fluid and then reassess patient    Would send off CSF to r/o infection - cell count, protein, glucose, viral panel including HSV  -If no improvement with LP then will consider trial of Sinemet or other parkinsons meds  -PT/OT  -Neurochecks  -Urinalysis

## 2017-06-20 NOTE — H&P ADULT - PROBLEM SELECTOR PLAN 2
Patient with known parkinson's disease. On physical exam patient with 5/5 strength UE/LE bilaterally with 3+ reflexes and cogwheeling rigidity UE bilaterally with syncopal event yesterday that could have occurred in the context of autonomic instability.  - Neurology already consulted, will consider placing patient on sinemet in AM  - Fall precautions  - Aspiration precautions  - No acute management required overnight

## 2017-06-20 NOTE — H&P ADULT - ASSESSMENT
77M [facility] Parkinson's disease (s/p stem cell transplant currently in an FDA trial) presented with agitation, visual hallucinations, urinary incontinence and a fall currently s/p LP r/o NPH vs. underlying infection vs worsening Parkinson's disease.

## 2017-06-20 NOTE — CONSULT NOTE ADULT - ATTENDING COMMENTS
Pt seen and examined.   77 M who was sent by Dr. Frank for r/o NPH. However, previous LP only removed 6 cc of CSF.   Recommend repeat spinal tap and assessment of gait before and after the large volume tap.

## 2017-06-20 NOTE — H&P ADULT - PROBLEM SELECTOR PLAN 3
Patient with isolated AST elevation to 113 in absence of positive CE in the context of being enrolled in FDA approved stem cell clinical trial  - Will trend LFT's for now  - patient w/o abdominal pain, nuria colored stools, or jaundice  - if LFT's continue to increase, can consider RUQ US to further evaluate for hepatobiliary pathology

## 2017-06-20 NOTE — ED ADULT TRIAGE NOTE - CHIEF COMPLAINT QUOTE
Pt fell yesterday, was seen at [facility].  Signed out AMA yesterday.  Pt was confused today, went to MD who sent him here.  CT done at [facility] which was negative.  Sent here for neurology consult.  A+OX3.  Received with #18g SL R wrist

## 2017-06-20 NOTE — ED ADULT NURSE NOTE - CHIEF COMPLAINT QUOTE
Pt fell yesterday, was seen at [place].  Signed out AMA yesterday.  Pt was confused today, went to MD who sent him here.  CT done at [facility] which was negative.  Sent here for neurology consult.  A+OX3.  Received with #18g SL R wrist

## 2017-06-20 NOTE — H&P ADULT - REASON FOR ADMISSION
agitation, visual hallucinations, urinary incontinence agitation, visual hallucinations, urinary incontinence of 1 day in duration

## 2017-06-20 NOTE — H&P ADULT - ATTENDING COMMENTS
Patient was seen and evaluated, agree with above with the following additions    77 y.o. man with history of parkinson's disease on stem cell study now with AMS, ataxia, and urinary incontinence likely secondary to NPH as patient appears to have clinically improved following LP which was done overnight.    - Neurology follow up in AM for confirmation of above diagnosis   - consider repeating LP in AM and if improvement is noted, would consult neurosurgery for possible  shunt   - Consider starting acetazolamide    # parkinson's diease   - Outpatient medication review indicates that patient was prescribed 90 tabs of sinemet  on 3/24/17, therefore would consider restarting if neurology service is in agreement. Patient was seen and evaluated, agree with above with the following additions    77 y.o. man with history of parkinson's disease on stem cell study now with AMS, ataxia, and urinary incontinence likely secondary to NPH as patient appears to have clinically improved following LP which was done overnight.    - Neurology follow up in AM for confirmation of above diagnosis   - consider repeating LP in AM and if improvement is noted, would consult neurosurgery for possible  shunt   - Consider starting acetazolamide    # Metabolic encephalopathy   - As likely secondary to above  - Fall risk precaution    # parkinson's diease   - Outpatient medication review indicates that patient was prescribed 90 tabs of sinemet  on 3/24/17, therefore would consider restarting if neurology service is in agreement.

## 2017-06-20 NOTE — ED PROVIDER NOTE - MEDICAL DECISION MAKING DETAILS
76 yo M a/w new dilated ventricles seen yesterday prior to patient AMA after fall, with agitiation, gait imbalance and incontinence concern for possible nph  - neuro  - cbc, cmp, cth

## 2017-06-21 LAB
ALBUMIN SERPL ELPH-MCNC: 3.5 G/DL — SIGNIFICANT CHANGE UP (ref 3.3–5)
ALP SERPL-CCNC: 93 U/L — SIGNIFICANT CHANGE UP (ref 40–120)
ALT FLD-CCNC: 34 U/L — SIGNIFICANT CHANGE UP (ref 4–41)
AST SERPL-CCNC: 92 U/L — HIGH (ref 4–40)
BILIRUB SERPL-MCNC: 0.8 MG/DL — SIGNIFICANT CHANGE UP (ref 0.2–1.2)
BUN SERPL-MCNC: 12 MG/DL — SIGNIFICANT CHANGE UP (ref 7–23)
CALCIUM SERPL-MCNC: 9 MG/DL — SIGNIFICANT CHANGE UP (ref 8.4–10.5)
CHLORIDE SERPL-SCNC: 104 MMOL/L — SIGNIFICANT CHANGE UP (ref 98–107)
CLARITY CSF: CLEAR — SIGNIFICANT CHANGE UP
CLARITY CSF: CLEAR — SIGNIFICANT CHANGE UP
CO2 SERPL-SCNC: 28 MMOL/L — SIGNIFICANT CHANGE UP (ref 22–31)
COLOR CSF: COLORLESS — SIGNIFICANT CHANGE UP
COLOR CSF: COLORLESS — SIGNIFICANT CHANGE UP
CREAT SERPL-MCNC: 0.81 MG/DL — SIGNIFICANT CHANGE UP (ref 0.5–1.3)
GLUCOSE CSF-MCNC: 66 MG/DL — SIGNIFICANT CHANGE UP (ref 40–70)
GLUCOSE SERPL-MCNC: 79 MG/DL — SIGNIFICANT CHANGE UP (ref 70–99)
GRAM STN CSF: SIGNIFICANT CHANGE UP
HCT VFR BLD CALC: 40.5 % — SIGNIFICANT CHANGE UP (ref 39–50)
HGB BLD-MCNC: 13.3 G/DL — SIGNIFICANT CHANGE UP (ref 13–17)
LYMPHOCYTES # CSF: 34 % — SIGNIFICANT CHANGE UP
LYMPHOCYTES # CSF: 90 % — SIGNIFICANT CHANGE UP
MAGNESIUM SERPL-MCNC: 1.9 MG/DL — SIGNIFICANT CHANGE UP (ref 1.6–2.6)
MCHC RBC-ENTMCNC: 30.1 PG — SIGNIFICANT CHANGE UP (ref 27–34)
MCHC RBC-ENTMCNC: 32.8 % — SIGNIFICANT CHANGE UP (ref 32–36)
MCV RBC AUTO: 91.6 FL — SIGNIFICANT CHANGE UP (ref 80–100)
MONOCYTES # CSF: 10 % — SIGNIFICANT CHANGE UP
MONOCYTES # CSF: 56 % — SIGNIFICANT CHANGE UP
NEUTS SEG NFR CSF MANUAL: 10 % — SIGNIFICANT CHANGE UP
NRBC NFR CSF: 2 CELL/UL — SIGNIFICANT CHANGE UP (ref 0–5)
NRBC NFR CSF: < 1 CELL/UL — SIGNIFICANT CHANGE UP (ref 0–5)
PHOSPHATE SERPL-MCNC: 3.3 MG/DL — SIGNIFICANT CHANGE UP (ref 2.5–4.5)
PLATELET # BLD AUTO: 183 K/UL — SIGNIFICANT CHANGE UP (ref 150–400)
PMV BLD: 11.5 FL — SIGNIFICANT CHANGE UP (ref 7–13)
POTASSIUM SERPL-MCNC: 3.6 MMOL/L — SIGNIFICANT CHANGE UP (ref 3.5–5.3)
POTASSIUM SERPL-SCNC: 3.6 MMOL/L — SIGNIFICANT CHANGE UP (ref 3.5–5.3)
PROT CSF-MCNC: 55.8 MG/DL — HIGH (ref 15–45)
PROT SERPL-MCNC: 6.3 G/DL — SIGNIFICANT CHANGE UP (ref 6–8.3)
RBC # BLD: 4.42 M/UL — SIGNIFICANT CHANGE UP (ref 4.2–5.8)
RBC # CSF: 225 CELL/UL — HIGH (ref 0–0)
RBC # CSF: 375 CELL/UL — HIGH (ref 0–0)
RBC # FLD: 12.8 % — SIGNIFICANT CHANGE UP (ref 10.3–14.5)
SODIUM SERPL-SCNC: 144 MMOL/L — SIGNIFICANT CHANGE UP (ref 135–145)
SPECIMEN SOURCE: SIGNIFICANT CHANGE UP
TOTAL CELLS COUNTED, SPINAL FLUID: 20 CELLS — SIGNIFICANT CHANGE UP
TOTAL CELLS COUNTED, SPINAL FLUID: 50 CELLS — SIGNIFICANT CHANGE UP
WBC # BLD: 10.05 K/UL — SIGNIFICANT CHANGE UP (ref 3.8–10.5)
WBC # FLD AUTO: 10.05 K/UL — SIGNIFICANT CHANGE UP (ref 3.8–10.5)
XANTHOCHROMIA: SIGNIFICANT CHANGE UP
XANTHOCHROMIA: SIGNIFICANT CHANGE UP

## 2017-06-21 PROCEDURE — 99223 1ST HOSP IP/OBS HIGH 75: CPT

## 2017-06-21 PROCEDURE — 99233 SBSQ HOSP IP/OBS HIGH 50: CPT | Mod: GC

## 2017-06-21 RX ORDER — CARBIDOPA AND LEVODOPA 25; 100 MG/1; MG/1
1 TABLET ORAL THREE TIMES A DAY
Qty: 0 | Refills: 0 | Status: DISCONTINUED | OUTPATIENT
Start: 2017-06-21 | End: 2017-06-22

## 2017-06-21 RX ADMIN — CARBIDOPA AND LEVODOPA 1 TABLET(S): 25; 100 TABLET ORAL at 22:29

## 2017-06-21 RX ADMIN — SODIUM CHLORIDE 100 MILLILITER(S): 9 INJECTION, SOLUTION INTRAVENOUS at 00:16

## 2017-06-21 RX ADMIN — SODIUM CHLORIDE 100 MILLILITER(S): 9 INJECTION, SOLUTION INTRAVENOUS at 09:02

## 2017-06-21 NOTE — PROGRESS NOTE ADULT - PROBLEM SELECTOR PLAN 2
Patient with known parkinson's disease. On physical exam patient with 5/5 strength UE/LE bilaterally with 3+ reflexes and cogwheeling rigidity UE bilaterally with syncopal event yesterday that could have occurred in the context of autonomic instability.  - Neurology already consulted  - Fall precautions  - Aspiration precautions  - No acute management required overnight  - Has had prior reaction to sinemet (unclear reaction) per family. Holding off on restarting medication until r/o NPH.

## 2017-06-21 NOTE — PROGRESS NOTE ADULT - PROBLEM SELECTOR PLAN 1
Patient with waxing and waning mental status typified by visual hallucinations, unsteady gait, urinary incontinence c/w NPH vs infection vs worsening parkinson's dementia. CT scan with prominent ventricles out of proportion to cerebral atrophy suggestive of NPH   - s/p LP s/p 6cc's CSF removed with normal opening pressure  - Pending large volume LP to assess NPH  - f/u CSF cultures, BCx, check RVP  - No Abx for now  - trend fever curve  - LR at 100cc/hr standing for fluid maintenance  - f/u Neurology recommendations  - No MRI with/without contrast per neurology

## 2017-06-21 NOTE — PROGRESS NOTE ADULT - SUBJECTIVE AND OBJECTIVE BOX
Medicine Progress/Accept Note:     Patient is a 77y old  Male who presents with a chief complaint of agitation, visual hallucinations, urinary incontinence of 1 day in duration (2017 23:11)    SUBJECTIVE / OVERNIGHT EVENTS:  Patient was admitted to medicine in setting of syncope/fall at home, visual hallucinations, and urinary incontinence. He was noted to have a CT head with ventriculomegaly. This morning he denied any history of seizures or heart disease. He denied fevers/chills, cough, shortness of breath, chest pain, palpitations, dysphagia, cough with eating, nausea or vomiting. He denied diarrhea, headaches or vision loss, and weight loss. He was noted to have paresthesias.       MEDICATIONS  (STANDING):  lactated ringers. 1000milliLiter(s) IV Continuous <Continuous>    MEDICATIONS  (PRN):  acetaminophen   Tablet 650milliGRAM(s) Oral every 6 hours PRN For Temp greater than 38 C (100.4 F)  acetaminophen   Tablet. 650milliGRAM(s) Oral every 6 hours PRN Mild to Moderate Pain (1-6)      Vital Signs Last 24 Hrs  T(C): 36.8, Max: 37.8 (- @ 18:22)  HR: 68 (66 - 70)  BP: 133/78 (113/72 - 133/78)  RR: 18 (16 - 18)  SpO2: 100% (98% - 100%)  Wt(kg): --  CAPILLARY BLOOD GLUCOSE    I&O's Summary      PHYSICAL EXAM:  GENERAL: NAD, well-developed  HEAD:  Bandage above eye, but no bruising  EYES: crusting surrounding eyes bilaterally  NECK: Supple  CHEST/LUNG: Clear to auscultation bilaterally; No wheeze, rales or rhonchi  HEART: Regular rate and rhythm; No murmurs, rubs, or gallops  ABDOMEN: Soft, Nontender, Nondistended; Bowel sounds present, no guarding or rigidity  EXTREMITIES: No edema  PSYCH: AAOx1-2 (name, [place], )  NEUROLOGY: non-focal  SKIN: No rashes or lesions    LABS:                        13.3   10.05 )-----------( 183      ( 2017 06:45 )             40.5     06-21    144  |  104  |  12  ----------------------------<  79  3.6   |  28  |  0.81    Ca    9.0      2017 06:45  Phos  3.3     06-21  Mg     1.9     06-21    TPro  6.3  /  Alb  3.5  /  TBili  0.8  /  DBili  x   /  AST  92<H>  /  ALT  34  /  AlkPhos  93  06-21      CARDIAC MARKERS ( 2017 18:10 )  x     / < 0.06 ng/mL / x     / x     / x          Urinalysis Basic - ( 2017 15:25 )    Color: Yellow / Appearance: Clear / S.024 / pH: x  Gluc: x / Ketone: Negative  / Bili: Negative / Urobili: Negative   Blood: x / Protein: Negative / Nitrite: Negative   Leuk Esterase: Negative / RBC: x / WBC 0-2 /HPF   Sq Epi: x / Non Sq Epi: x / Bacteria: x        RADIOLOGY & ADDITIONAL TESTS:    Imaging Personally Reviewed:    Consultant(s) Notes Reviewed:      Care Discussed with Consultants/Other Providers:

## 2017-06-21 NOTE — PROGRESS NOTE ADULT - PROBLEM SELECTOR PLAN 4
Patient s/p LP. Continue SCD's as pending repeat LP  - Fall risk precautions  - Aspiration risk precautions

## 2017-06-21 NOTE — PROCEDURE NOTE - NSPROCDETAILS_GEN_ALL_CORE
30 cc/location identified, draped/prepped, sterile technique used, needle inserted/introduced/area cleaned in sterile fashion/CSF Obtained

## 2017-06-21 NOTE — PROGRESS NOTE ADULT - ASSESSMENT
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 worsening Parkinson's disease.

## 2017-06-22 ENCOUNTER — TRANSCRIPTION ENCOUNTER (OUTPATIENT)
Age: 77
End: 2017-06-22

## 2017-06-22 LAB — VDRL CSF-TITR: NEGATIVE — SIGNIFICANT CHANGE UP

## 2017-06-22 PROCEDURE — 99232 SBSQ HOSP IP/OBS MODERATE 35: CPT | Mod: GC

## 2017-06-22 RX ORDER — ACETAMINOPHEN 500 MG
2 TABLET ORAL
Qty: 0 | Refills: 0 | COMMUNITY
Start: 2017-06-22

## 2017-06-22 RX ORDER — CARBIDOPA AND LEVODOPA 25; 100 MG/1; MG/1
1 TABLET ORAL EVERY 8 HOURS
Qty: 0 | Refills: 0 | Status: DISCONTINUED | OUTPATIENT
Start: 2017-06-22 | End: 2017-06-24

## 2017-06-22 RX ADMIN — SODIUM CHLORIDE 100 MILLILITER(S): 9 INJECTION, SOLUTION INTRAVENOUS at 04:15

## 2017-06-22 RX ADMIN — CARBIDOPA AND LEVODOPA 1 TABLET(S): 25; 100 TABLET ORAL at 13:12

## 2017-06-22 RX ADMIN — CARBIDOPA AND LEVODOPA 1 TABLET(S): 25; 100 TABLET ORAL at 22:31

## 2017-06-22 RX ADMIN — CARBIDOPA AND LEVODOPA 1 TABLET(S): 25; 100 TABLET ORAL at 05:14

## 2017-06-22 NOTE — DISCHARGE NOTE ADULT - PATIENT PORTAL LINK FT
“You can access the FollowHealth Patient Portal, offered by [facility], by registering with the following website: [email or web address]”

## 2017-06-22 NOTE — DISCHARGE NOTE ADULT - COMMUNITY RESOURCES
[facility] [address] [place] [address] [phone]     [facility] [phone]

## 2017-06-22 NOTE — PHYSICAL THERAPY INITIAL EVALUATION ADULT - PERTINENT HX OF CURRENT PROBLEM, REHAB EVAL
77M [facility] Parkinson's disease (s/p stem cell transplant currently in an FDA trial) presented with agitation, visual hallucinations, urinary incontinence and a fall. Of note, patient was seen at [facility] on 6/19/2017 for syncope, offered admission for syncope; however, left AMA. When patient was at home overnight he was having urinary incontinence, agitation, and was unable to walk.

## 2017-06-22 NOTE — PHYSICAL THERAPY INITIAL EVALUATION ADULT - PLANNED THERAPY INTERVENTIONS, PT EVAL
strengthening/balance training/Pt left supine in bed in NAD; call bell in reach; SHAINA Greer aware; wife at bedside./gait training/bed mobility training/transfer training

## 2017-06-22 NOTE — PROGRESS NOTE ADULT - PROBLEM SELECTOR PLAN 3
Patient with isolated AST elevation to 113 in absence of positive CE in the context of being enrolled in FDA approved stem cell clinical trial  - Will trend LFT's for now  - Patient w/o abdominal pain, nuria colored stools, or jaundice  - if LFT's continue to increase, can consider RUQ US to further evaluate for hepatobiliary pathology  - AST may have been elevated in setting of fall, unclear if bone etiology, consider repeating CMP with AM labs tomorrow

## 2017-06-22 NOTE — DISCHARGE NOTE ADULT - MEDICATION SUMMARY - MEDICATIONS TO TAKE
I will START or STAY ON the medications listed below when I get home from the hospital:    acetaminophen 325 mg oral tablet  -- 2 tab(s) by mouth every 6 hours, As needed, For Temp greater than 38 C (100.4 F)  -- Indication: For Fever or pain    acetaminophen 325 mg oral tablet  -- 2 tab(s) by mouth every 6 hours, As needed, Mild to Moderate Pain (1-6)  -- Indication: For Fever or pain    carbidopa-levodopa 25 mg-100 mg oral tablet  -- 1 tab(s) by mouth every 8 hours  -- Indication: For Parkinson disease    Coenzyme Q10 200 mg oral capsule  -- 3 cap(s) by mouth once a day  -- Indication: For Home vitamin I will START or STAY ON the medications listed below when I get home from the hospital:    acetaminophen 325 mg oral tablet  -- 2 tab(s) by mouth every 6 hours, As needed, For Temp greater than 38 C (100.4 F)  -- Indication: For Fever or pain    acetaminophen 325 mg oral tablet  -- 2 tab(s) by mouth every 6 hours, As needed, Mild to Moderate Pain (1-6)  -- Indication: For Fever or pain    carbidopa-levodopa 25 mg-100 mg oral tablet  -- 1 tab(s) by mouth every 8 hours  -- Indication: For Parkinson disease    docusate sodium 100 mg oral capsule  -- 1 cap(s) by mouth 3 times a day, As Needed constipation  -- Indication: For Constipation    senna oral tablet  -- 2 tab(s) by mouth once a day (at bedtime) As Needed constipation  -- Indication: For Constipation    bisacodyl 10 mg rectal suppository  -- 1 suppository(ies) rectally once a day, As needed, Constipation  -- Indication: For Constipation    polyethylene glycol 3350 oral powder for reconstitution  -- 17 gram(s) by mouth once a day, As needed, Constipation  -- Indication: For Constipation    Coenzyme Q10 200 mg oral capsule  -- 3 cap(s) by mouth once a day  -- Indication: For Home vitamin

## 2017-06-22 NOTE — PROGRESS NOTE ADULT - PROBLEM SELECTOR PLAN 2
Patient with known parkinson's disease. Syncopal event yesterday may have been 2/2 autonomic instability.  - Continue to follow neurology recommendations  - Pending PT evaluation  - Fall precautions  - Aspiration precautions  - Restarted low dose sinemet, has had prior unclear reaction per family. Continue to monitor for symptoms/complaints

## 2017-06-22 NOTE — DISCHARGE NOTE ADULT - PLAN OF CARE
Stability/improvement You came into the hospital in setting of hallucinations and fall. You had a lumbar puncture performed by neurology. You were determined to not have NPH. Your symptoms are likely related to your Parkinson's disease. You may have progression of your disease, which could lead to changes in your blood pressure. You will need to take ----- while at rehab. You were evaluated by physical therapy, who recommended that you go to an acute rehab facility. Please make an appointment to see your neurologist after rehab. You came into the hospital in setting of hallucinations and fall. You had a lumbar puncture performed by neurology. You were determined to not have NPH. Your symptoms are likely related to your Parkinson's disease. You may have progression of your disease, which could lead to changes in your blood pressure. You will need to take Sinemet while at rehab. You were evaluated by physical therapy, who recommended that you go to an acute rehab facility. Please make an appointment to see your neurologist after rehab to adjust your medication.

## 2017-06-22 NOTE — DISCHARGE NOTE ADULT - CARE PROVIDER_API CALL
Pa Tabares), Internal Medicine  [address] [place] [address]  Phone: [phone]  Fax: [phone] Pa Tabares), Internal Medicine  [address] [place] [address]  Phone: [phone]  Fax: [phone]    Chan Beaver (DO), Neurology  [address] [place] [address]  Phone: [phone]  Fax: [phone]

## 2017-06-22 NOTE — DISCHARGE NOTE ADULT - CARE PLAN
Principal Discharge DX:	Parkinson disease  Goal:	Stability/improvement  Instructions for follow-up, activity and diet:	You came into the hospital in setting of hallucinations and fall. You had a lumbar puncture performed by neurology. You were determined to not have NPH. Your symptoms are likely related to your Parkinson's disease. You may have progression of your disease, which could lead to changes in your blood pressure. You will need to take ----- while at rehab. You were evaluated by physical therapy, who recommended that you go to an acute rehab facility. Please make an appointment to see your neurologist after rehab. Principal Discharge DX:	Parkinson disease  Goal:	Stability/improvement  Instructions for follow-up, activity and diet:	You came into the hospital in setting of hallucinations and fall. You had a lumbar puncture performed by neurology. You were determined to not have NPH. Your symptoms are likely related to your Parkinson's disease. You may have progression of your disease, which could lead to changes in your blood pressure. You will need to take Sinemet while at rehab. You were evaluated by physical therapy, who recommended that you go to an acute rehab facility. Please make an appointment to see your neurologist after rehab to adjust your medication.

## 2017-06-22 NOTE — DISCHARGE NOTE ADULT - CARE PROVIDERS DIRECT ADDRESSES
[email or web address]

## 2017-06-22 NOTE — PROGRESS NOTE ADULT - ASSESSMENT
77M [facility] Parkinson's disease (s/p stem cell transplant currently in an FDA trial) presented with agitation, visual hallucinations, urinary incontinence and a fall currently likely 2/2 to worsening Parkinson's disease with possible underlying autonomic instablity.

## 2017-06-22 NOTE — PROGRESS NOTE ADULT - SUBJECTIVE AND OBJECTIVE BOX
Patient is a 77y old  Male who presents with a chief complaint of agitation, visual hallucinations, urinary incontinence of 1 day in duration (20 Jun 2017 23:11)      SUBJECTIVE / OVERNIGHT EVENTS:  Overnight the patient did not have any acute events. Patient had large volume LP yesterday without improvement of gait; unlikely NPH. No complaints this AM. Continues to have eye crusting without eye pain. No fevers or hallucinations. Denied any headaches. Denied further episodes of incontinence. Has received sinemet last night.     MEDICATIONS  (STANDING):  lactated ringers. 1000milliLiter(s) IV Continuous <Continuous>  carbidopa/levodopa  10/100 1Tablet(s) Oral three times a day    MEDICATIONS  (PRN):  acetaminophen   Tablet 650milliGRAM(s) Oral every 6 hours PRN For Temp greater than 38 C (100.4 F)  acetaminophen   Tablet. 650milliGRAM(s) Oral every 6 hours PRN Mild to Moderate Pain (1-6)      Vital Signs Last 24 Hrs  T(C): 36.9, Max: 37.1 (06-21 @ 15:18)  HR: 69 (68 - 70)  BP: 136/79 (112/63 - 136/79)  RR: 17 (17 - 18)  SpO2: 99% (99% - 100%)  Wt(kg): --  CAPILLARY BLOOD GLUCOSE    I&O's Summary    I & Os for current day (as of 22 Jun 2017 11:00)  =============================================  IN: 1100 ml / OUT: 200 ml / NET: 900 ml      PHYSICAL EXAM:  GENERAL: NAD, well-developed  HEAD:  Atraumatic, Normocephalic  EYES: Eye crusting surrounding eye lashes  CHEST/LUNG: Clear to auscultation bilaterally; No wheeze, rales or rhonchi in anterior lung fields  HEART: Regular rate and rhythm; No murmurs, rubs, or gallops  ABDOMEN: Soft, Nontender, Nondistended; Bowel sounds present, no guarding or rigidity  EXTREMITIES:  No edema  PSYCH: Awake, alert, answering questions  NEUROLOGY: non-focal  SKIN: No rashes or lesions    LABS:                        13.3   10.05 )-----------( 183      ( 21 Jun 2017 06:45 )             40.5     06-21    144  |  104  |  12  ----------------------------<  79  3.6   |  28  |  0.81    Ca    9.0      21 Jun 2017 06:45  Phos  3.3     06-21  Mg     1.9     06-21    TPro  6.3  /  Alb  3.5  /  TBili  0.8  /  DBili  x   /  AST  92<H>  /  ALT  34  /  AlkPhos  93  06-21      CARDIAC MARKERS ( 20 Jun 2017 18:10 )  x     / < 0.06 ng/mL / x     / x     / x              RADIOLOGY & ADDITIONAL TESTS:    Imaging Personally Reviewed:    Consultant(s) Notes Reviewed:      Care Discussed with Consultants/Other Providers:

## 2017-06-22 NOTE — PROGRESS NOTE ADULT - PROBLEM SELECTOR PLAN 1
Patient with waxing and waning mental status typified by visual hallucinations, unsteady gait, urinary incontinence c/w NPH vs infection vs worsening parkinson's dementia. CT scan with prominent ventricles out of proportion to cerebral atrophy suggestive of NPH. After large volume LP did not have improvement of gait, suggestive that this is not secondary to NPH. (Did not endorse VALLADARES after LP). Changes in mental state and falls may be secondary to underlying PD with possible component of autonomic instablility. BPs have been stable on admission.   - Blood and CSF cultures negative  - Continue to follow neurology recommendations  - No MRI with/without contrast per neurology Encephalopathy likely 2/2 worsening Parkinson's dementia with possible component of autonomic instability.   - Patient with waxing and waning mental status typified by visual hallucinations, unsteady gait, urinary incontinence c/w NPH vs infection vs worsening parkinson's dementia. CT scan with prominent ventricles out of proportion to cerebral atrophy suggestive of NPH. After large volume LP did not have improvement of gait, suggestive that this is not secondary to NPH. (Did not endorse VALLADARES after LP). Changes in mental state and falls may be secondary to underlying PD. BPs have been stable on admission.   - Blood and CSF cultures negative  - Continue to follow neurology recommendations  - No MRI with/without contrast per neurology

## 2017-06-23 DIAGNOSIS — Z74.09 OTHER REDUCED MOBILITY: ICD-10-CM

## 2017-06-23 LAB
ALBUMIN SERPL ELPH-MCNC: 3.6 G/DL — SIGNIFICANT CHANGE UP (ref 3.3–5)
ALP SERPL-CCNC: 63 U/L — SIGNIFICANT CHANGE UP (ref 40–120)
ALT FLD-CCNC: 8 U/L — SIGNIFICANT CHANGE UP (ref 4–41)
AST SERPL-CCNC: 46 U/L — HIGH (ref 4–40)
BILIRUB DIRECT SERPL-MCNC: 0.2 MG/DL — SIGNIFICANT CHANGE UP (ref 0.1–0.2)
BILIRUB SERPL-MCNC: 1.2 MG/DL — SIGNIFICANT CHANGE UP (ref 0.2–1.2)
PROT SERPL-MCNC: 6.9 G/DL — SIGNIFICANT CHANGE UP (ref 6–8.3)

## 2017-06-23 PROCEDURE — 99222 1ST HOSP IP/OBS MODERATE 55: CPT

## 2017-06-23 PROCEDURE — 99232 SBSQ HOSP IP/OBS MODERATE 35: CPT | Mod: GC

## 2017-06-23 RX ORDER — CARBIDOPA AND LEVODOPA 25; 100 MG/1; MG/1
1 TABLET ORAL
Qty: 0 | Refills: 0 | COMMUNITY
Start: 2017-06-23

## 2017-06-23 RX ORDER — DOCUSATE SODIUM 100 MG
100 CAPSULE ORAL THREE TIMES A DAY
Qty: 0 | Refills: 0 | Status: DISCONTINUED | OUTPATIENT
Start: 2017-06-23 | End: 2017-06-24

## 2017-06-23 RX ORDER — SENNA PLUS 8.6 MG/1
2 TABLET ORAL AT BEDTIME
Qty: 0 | Refills: 0 | Status: DISCONTINUED | OUTPATIENT
Start: 2017-06-23 | End: 2017-06-24

## 2017-06-23 RX ADMIN — CARBIDOPA AND LEVODOPA 1 TABLET(S): 25; 100 TABLET ORAL at 16:59

## 2017-06-23 RX ADMIN — Medication 100 MILLIGRAM(S): at 21:08

## 2017-06-23 RX ADMIN — SENNA PLUS 2 TABLET(S): 8.6 TABLET ORAL at 21:08

## 2017-06-23 RX ADMIN — CARBIDOPA AND LEVODOPA 1 TABLET(S): 25; 100 TABLET ORAL at 06:44

## 2017-06-23 NOTE — PROGRESS NOTE ADULT - SUBJECTIVE AND OBJECTIVE BOX
Patient is a 77y old  Male who presents with a chief complaint of agitation, visual hallucinations, urinary incontinence of 1 day in duration (22 Jun 2017 14:26)      SUBJECTIVE / OVERNIGHT EVENTS:  Overnight the patient did not have any acute events. No complaints this AM. Was intermittently snoring during exam, although appeared awake. AxO1-2. Denied incontinence. No complaints this morning.    MEDICATIONS  (STANDING):  carbidopa/levodopa  25/100 1Tablet(s) Oral every 8 hours    MEDICATIONS  (PRN):  acetaminophen   Tablet 650milliGRAM(s) Oral every 6 hours PRN For Temp greater than 38 C (100.4 F)  acetaminophen   Tablet. 650milliGRAM(s) Oral every 6 hours PRN Mild to Moderate Pain (1-6)      Vital Signs Last 24 Hrs  T(C): 37.1, Max: 37.1 (06-22 @ 14:50)  HR: 77 (73 - 79)  BP: 140/77 (107/62 - 140/77)  RR: 18 (18 - 18)  SpO2: 99% (99% - 100%)  Wt(kg): --  CAPILLARY BLOOD GLUCOSE    PHYSICAL EXAM:  GENERAL: NAD, well-developed  HEAD:  Atraumatic, Normocephalic  EYES: Eye crusting  CHEST/LUNG: Clear to auscultation bilaterally; No wheeze, rales, or rhonchi  HEART: Regular rate and rhythm; No murmurs, rubs, or gallops  ABDOMEN: Soft, Nontender, Nondistended; Bowel sounds present, no rigidity or guarding  EXTREMITIES:  No edema  PSYCH: AAOx1-2 (name, "July 23rd," not year)   NEUROLOGY: non-focal  SKIN: No rashes or lesions    LABS:    TPro  6.9  /  Alb  3.6  /  TBili  1.2  /  DBili  0.2  /  AST  46<H>  /  ALT  8   /  AlkPhos  63  06-23

## 2017-06-23 NOTE — CONSULT NOTE ADULT - ASSESSMENT
The patient is a 76 y/o male PMHx Parkinson's disease, lumbar DDD s/p syncopy/fall with gait impairment 2/2 advanced Parkinsonism.
The patient is a 77 year old male that presents to the ER with complaints of altered mental status and difficulty walking - with a CT scan that shows concern for NPH with ventriculomegaly. The patient also has a low grade fever with altered mental status with concern for an underlying infection, however, patient does not have any nuchal rigidity on exam. Concern for NPH vs. underlying infection vs worsening parkinsons

## 2017-06-23 NOTE — PROGRESS NOTE ADULT - PROBLEM SELECTOR PLAN 1
Encephalopathy likely 2/2 worsening Parkinson's dementia with possible component of autonomic instability.   - Patient with waxing and waning mental status typified by visual hallucinations, unsteady gait, urinary incontinence. CT scan with prominent ventricles out of proportion to cerebral atrophy suggestive of NPH, however after large volume LP did not have improvement of gait. This is suggestive that this is not secondary to NPH. Changes in mental state and falls likely be secondary to underlying PD. BPs have been stable on admission.   - Continue to uptitrate sinemet   - Blood and CSF cultures negative  - Continue to follow neurology recommendations  - No MRI with/without contrast per neurology  - Pending PM+R consult for acute rehab.

## 2017-06-23 NOTE — PROGRESS NOTE ADULT - ASSESSMENT
77M [facility] Parkinson's disease (s/p stem cell transplant currently in an FDA trial) presented with agitation, visual hallucinations, urinary incontinence and a fall currently likely 2/2 to worsening Parkinson's disease with possible underlying autonomic instablity.

## 2017-06-23 NOTE — PROGRESS NOTE ADULT - PROBLEM SELECTOR PLAN 4
Continue SCD's   - Fall risk precautions  - Aspiration risk precautions  - Likely discharge to acute rehab facility. Pending PMR consult.

## 2017-06-23 NOTE — CONSULT NOTE ADULT - SUBJECTIVE AND OBJECTIVE BOX
Patient is a 77y old  Male who presents with a chief complaint of agitation, visual hallucinations, urinary incontinence of 1 day in duration (22 Jun 2017 14:26)      HPI:  77M PMH Parkinson's disease (s/p stem cell transplant currently in an FDA trial) presented with agitation, visual hallucinations, urinary incontinence and a fall. Of note, patient was seen at [facility] on 6/19/2017 for syncope, offered admission for syncope; however, left AMA. When patient was at home overnight he was having urinary incontinence, agitation, and was unable to walk.  Patient was noted to be agitated overnight with visual hallucinations, and generalized weakness.  Wife noted that she was unable to move him this morning. Family called neurologist who referred the patient to the ER for a lumbar puncture and evaluation by neurology and neurosurgery.   HCT- (-)B/M/S. LP done without improvement in gait.      REVIEW OF SYSTEMS: No chest pain, shortness of breath, nausea, vomiting or diarhea.  No pain, (+)hx digit fx, no OA    PAST MEDICAL & SURGICAL HISTORY  Parkinson disease  Kidney stone  History of appendectomy  Abdominal adhesions      SOCIAL HISTORY  Smoking - Denied, EtOH - Denied, Drugs - Denied    FUNCTIONAL HISTORY:   Lives with wife in an elevated apt. No HHA  Independent ambulation +/-RW, (A) bed transfer, (I) tiole      FAMILY HISTORY   No pertinent family history in first degree relatives      RECENT LABS/IMAGING        TPro  6.9  /  Alb  3.6  /  TBili  1.2  /  DBili  0.2  /  AST  46<H>  /  ALT  8   /  AlkPhos  63  06-23        VITALS  T(C): 37.1, Max: 37.1 (06-22 @ 14:50)  HR: 77 (73 - 79)  BP: 140/77 (107/62 - 140/77)  RR: 18 (18 - 18)  SpO2: 99% (99% - 100%)  Wt(kg): --    ALLERGIES  No Known Allergies      MEDICATIONS   acetaminophen   Tablet 650milliGRAM(s) Oral every 6 hours PRN  acetaminophen   Tablet. 650milliGRAM(s) Oral every 6 hours PRN  carbidopa/levodopa  25/100 1Tablet(s) Oral every 8 hours      ----------------------------------------------------------------------------------------  PHYSICAL EXAM  Constitutional - NAD, Comfortable  HEENT - NCAT, EOMI  Chest - CTA bilaterally, No wheeze, No rhonchi, No crackles  Cardiovascular - RRR, S1S2  Abdomen - BS+, Soft, NTND  Extremities - No C/C/E, No calf tenderness   Neurologic Exam -                    Cognitive - Awake, Alert, folows 2SC     Communication - monotonous no aphasia     Cranial Nerves - no facial asymmetry, tongue midline, EOMI     Motor -BUE: (+)rigidity, shoulder flexion 90 degrees (B) 4-4+/5mp   BLE: (+)rigidity, hip flexion 4-4+/5mp, otherwise 5/5mp                       Sensory - Intact to LT     Reflexes -0 KJ, 0 AJ, symmetric       Psychiatric - Mood stable, Affect flat    CURRENT FUNCTIONAL STATUS: supine-sit (A). sit-stand (A). ambulated few steps with shuffling short steps
Neurology Consult    Name  ALVARO DUBOIS    HPI: The patient is a 77 year old male with a PMH of HLD, parkinson's disease s/p stem cell transplant currently in an FDA trial, arrived after a fall yesterday.  Patient was seen at [facility] with CT revealing no mass, shift acute bleed, CT neck no acute fracture, and laceration now repaired.  The patient was offered admission for syncope, however, the patient and wife decided to leave after initial testing, however, when patient was at home overnight he was having urinary incontinence, agitation, and was unable to walk.  Patient was noted to be agitated overnight with visual hallucinations, and generalized weakness.  Wife noted that she was unable to move him this morning. Family called neurologist who referred the patient to the ER for a lumbar puncture and evaluation by neurology and neurosurgery.       MEDICATIONS  (STANDING):    MEDICATIONS  (PRN):      Allergies    No Known Allergies    Intolerances        Objective:   Vital Signs Last 24 Hrs  T(C): 37.8, Max: 37.8 (06-20 @ 18:22)  T(F): 100.1, Max: 100.1 (06-20 @ 18:22)  HR: 70 (70 - 70)  BP: 119/68 (119/68 - 119/68)  BP(mean): --  RR: 16 (16 - 16)  SpO2: 98% (98% - 98%)    General Exam:   General appearance: No acute distress                     Neurological Exam:  Mental Status: Orientated to self, and place only.  Attention intact.  Slight dysarthria, noaphasia or neglect.  Follows simple commands      Cranial Nerves: CN I - not tested.  PERRL, EOMI, VFF to threat bilaterally, no nystagmus or diplopia.CN V1-3 intact to light touch and pinprick.  No facial asymmetry.  Hearing intact to finger rub bilaterally.  Tongue, uvula and palate midline.  Sternocleidomastoid and Trapezius intact bilaterally.    Motor:   Tone: Increased, R> L                  Strength: intact throughout - subtle weakness on left lower extremity on hip flexion  Pronator drift: none                 Dysmeria: None to finger-nose-finger  Sensation: intact to light touch throughout    Deep Tendon Reflexes: 2-3+ bilateral biceps, triceps, brachioradialis, 2 +knee and ankle  Toes flexor bilaterally    Gait: Patient unable to ambulate    Other:    06-20    145  |  104  |  10  ----------------------------<  86  3.9   |  27  |  0.92    Ca    9.3      20 Jun 2017 18:10    TPro  7.0  /  Alb  4.0  /  TBili  1.0  /  DBili  x   /  AST  113<H>  /  ALT  29  /  AlkPhos  98  06-20    06-20    145  |  104  |  10  ----------------------------<  86  3.9   |  27  |  0.92    Ca    9.3      20 Jun 2017 18:10    TPro  7.0  /  Alb  4.0  /  TBili  1.0  /  DBili  x   /  AST  113<H>  /  ALT  29  /  AlkPhos  98  06-20    LIVER FUNCTIONS - ( 20 Jun 2017 18:10 )  Alb: 4.0 g/dL / Pro: 7.0 g/dL / ALK PHOS: 98 u/L / ALT: 29 u/L / AST: 113 u/L / GGT: x             Radiology  CT head: No acute intracranial hemorrhage or CT evidence of acute territorial   infarct.    Prominent ventricular size out of proportion to cerebral atrophy,   suggestive of normal pressure hydrocephalus, stable from prior exam.
Patient is a 77y old  Male who presents with a chief complaint of agitation, visual hallucinations, urinary incontinence of 1 day in duration (22 Jun 2017 14:26)      HPI:  77M PMH Parkinson's disease (s/p stem cell transplant currently in an FDA trial) presented with agitation, visual hallucinations, urinary incontinence and a fall. Of note, patient was seen at [facility] on 6/19/2017 for syncope, offered admission for syncope; however, left AMA. When patient was at home overnight he was having urinary incontinence, agitation, and was unable to walk.  Patient was noted to be agitated overnight with visual hallucinations, and generalized weakness.  Wife noted that she was unable to move him this morning. Family called neurologist who referred the patient to the ER for a lumbar puncture and evaluation by neurology and neurosurgery.   Hos    In ED T: 100.1  BP: 119/68  HR: 70  RR: 16  SpO2: 98%RA. Patient provided CThead demonstrating no intracranial derangements. BCx taken and Neurology consulted, performed LP with 6cc removed, opening pressure noted to be [id number]. (20 Jun 2017 23:11)      REVIEW OF SYSTEMS: No chest pain, shortness of breath, nausea, vomiting or diarhea.      PAST MEDICAL & SURGICAL HISTORY  Parkinson disease  Kidney stone  History of appendectomy  Abdominal adhesions      SOCIAL HISTORY  Smoking - Denied, EtOH - Denied, Drugs - Denied    FUNCTIONAL HISTORY:   Lives   Independent    CURRENT FUNCTIONAL STATUS:      FAMILY HISTORY   No pertinent family history in first degree relatives      RECENT LABS/IMAGING        TPro  6.9  /  Alb  3.6  /  TBili  1.2  /  DBili  0.2  /  AST  46<H>  /  ALT  8   /  AlkPhos  63  06-23        VITALS  T(C): 37.1, Max: 37.1 (06-22 @ 14:50)  HR: 77 (73 - 79)  BP: 140/77 (107/62 - 140/77)  RR: 18 (18 - 18)  SpO2: 99% (99% - 100%)  Wt(kg): --    ALLERGIES  No Known Allergies      MEDICATIONS   acetaminophen   Tablet 650milliGRAM(s) Oral every 6 hours PRN  acetaminophen   Tablet. 650milliGRAM(s) Oral every 6 hours PRN  carbidopa/levodopa  25/100 1Tablet(s) Oral every 8 hours      ----------------------------------------------------------------------------------------  PHYSICAL EXAM  Constitutional - NAD, Comfortable  HEENT - NCAT, EOMI  Neck - Supple, No limited ROM  Chest - CTA bilaterally, No wheeze, No rhonchi, No crackles  Cardiovascular - RRR, S1S2, No murmurs  Abdomen - BS+, Soft, NTND  Extremities - No C/C/E, No calf tenderness   Neurologic Exam -                    Cognitive - Awake, Alert, AAO to self, place, date, year, situation     Communication - Fluent, No dysarthria, no aphasia     Cranial Nerves - CN 2-12 intact     Motor - No focal deficits                       Sensory - Intact to LT     Reflexes - DTR Intact, No primitive reflexive     Balance - WNL Static  Psychiatric - Mood stable, Affect WNL

## 2017-06-24 VITALS
DIASTOLIC BLOOD PRESSURE: 85 MMHG | HEART RATE: 85 BPM | SYSTOLIC BLOOD PRESSURE: 134 MMHG | TEMPERATURE: 98 F | OXYGEN SATURATION: 98 % | RESPIRATION RATE: 17 BRPM

## 2017-06-24 DIAGNOSIS — R07.0 PAIN IN THROAT: ICD-10-CM

## 2017-06-24 PROCEDURE — 99239 HOSP IP/OBS DSCHRG MGMT >30: CPT

## 2017-06-24 RX ORDER — SENNA PLUS 8.6 MG/1
2 TABLET ORAL
Qty: 0 | Refills: 0 | COMMUNITY
Start: 2017-06-24

## 2017-06-24 RX ORDER — POLYETHYLENE GLYCOL 3350 17 G/17G
17 POWDER, FOR SOLUTION ORAL DAILY
Qty: 0 | Refills: 0 | Status: DISCONTINUED | OUTPATIENT
Start: 2017-06-24 | End: 2017-06-24

## 2017-06-24 RX ORDER — POLYETHYLENE GLYCOL 3350 17 G/17G
17 POWDER, FOR SOLUTION ORAL
Qty: 0 | Refills: 0 | COMMUNITY
Start: 2017-06-24

## 2017-06-24 RX ORDER — DOCUSATE SODIUM 100 MG
1 CAPSULE ORAL
Qty: 0 | Refills: 0 | COMMUNITY
Start: 2017-06-24

## 2017-06-24 RX ADMIN — CARBIDOPA AND LEVODOPA 1 TABLET(S): 25; 100 TABLET ORAL at 00:22

## 2017-06-24 RX ADMIN — Medication 100 MILLIGRAM(S): at 07:10

## 2017-06-24 RX ADMIN — CARBIDOPA AND LEVODOPA 1 TABLET(S): 25; 100 TABLET ORAL at 07:10

## 2017-06-24 NOTE — PROGRESS NOTE ADULT - ATTENDING COMMENTS
Patient seen and examined. Agree with above note by resident.    Patient with abnormal gait, urinary incontinence and confusion. Pt without any improvement s/p high volume LP which makes NPH less likely. Symptoms likely due to parkinson's disease. Start senemet and titrate up as tolerate. Per PT recs patient will benefit with acute rehab however PMR recommends subacute rehab. Plan discussed with wife at bedside. She is in agreement with the plan. SW to start planning discharge to rehab.     Medically stable to be discharged to rehab today. Time spent >40 mins
Patient seen and examined. Agree with above note by resident.    Patient with signs and symptoms consistent with NPH. Pt with abnormal gait, urinary incontinence and confusion. Symptoms slightly improved per wife at bedside after LP during which only 6cc of fluid was removed. Per my discussion with neurology resident, we need to repeat LP and remove 30cc of fluids and then assess for improvement in symptoms. Neuro team to perform LP today. Also if improvement noted, pt would benefit from shunt. PLan discussed with wife at bedside. She is in agreement with the plan.
Patient seen and examined. Agree with above note by resident.    Patient with abnormal gait, urinary incontinence and confusion. Pt without any improvement s/p high volume LP which makes NPH less likely. Symptoms likely due to parkinson's disease. Start senemet and titrate up as tolerate. Per PT recs patient will benefit with acute rehab. Plan discussed with wife at bedside. She is in agreement with the plan.
Patient seen and examined at bedside. I agree with resident findings assessment and plan as noted above. Discharge to rehab today with close outpatient neurology follow-up

## 2017-06-24 NOTE — PROGRESS NOTE ADULT - PROBLEM SELECTOR PLAN 4
Likely 2/2 xerostomia in setting of mouth breathing. Unlikely infectious.   - Conservative measures  - May need mouth swabs

## 2017-06-24 NOTE — PROGRESS NOTE ADULT - PROBLEM SELECTOR PLAN 1
Encephalopathy likely 2/2 worsening Parkinson's dementia with possible component of autonomic instability.   - Patient with waxing and waning mental status typified by visual hallucinations, unsteady gait, urinary incontinence. CT scan with prominent ventricles out of proportion to cerebral atrophy suggestive of NPH, however after large volume LP did not have improvement of gait. This is suggestive that this is not secondary to NPH. Changes in mental state and falls likely be secondary to underlying PD. BPs have been stable on admission.   - Continue to uptitrate sinemet   - Blood and CSF cultures negative  - Continue to follow neurology recommendations  - No MRI with/without contrast per neurology  - Pending rehab today

## 2017-06-24 NOTE — PROGRESS NOTE ADULT - SUBJECTIVE AND OBJECTIVE BOX
Patient is a 77y old  Male who presents with a chief complaint of agitation, visual hallucinations, urinary incontinence of 1 day in duration (22 Jun 2017 14:26)      SUBJECTIVE / OVERNIGHT EVENTS:  Overnight the patient did not have any acute events.       MEDICATIONS  (STANDING):  carbidopa/levodopa  25/100 1Tablet(s) Oral every 8 hours  senna 2Tablet(s) Oral at bedtime  docusate sodium 100milliGRAM(s) Oral three times a day    MEDICATIONS  (PRN):  acetaminophen   Tablet 650milliGRAM(s) Oral every 6 hours PRN For Temp greater than 38 C (100.4 F)  acetaminophen   Tablet. 650milliGRAM(s) Oral every 6 hours PRN Mild to Moderate Pain (1-6)  bisacodyl Suppository 10milliGRAM(s) Rectal daily PRN Constipation      Vital Signs Last 24 Hrs  T(C): 36.4, Max: 37.8 (06-23 @ 15:39)  HR: 85 (71 - 85)  BP: 134/85 (107/64 - 134/85)  RR: 17 (17 - 18)  SpO2: 98% (98% - 99%)  Wt(kg): --  CAPILLARY BLOOD GLUCOSE    I&O's Summary      PHYSICAL EXAM:  GENERAL: NAD, well-developed  HEAD:  Atraumatic, Normocephalic  EYES: EOMI, PERRLA, conjunctiva and sclera clear  NECK: Supple, No JVD  CHEST/LUNG: Clear to auscultation bilaterally; No wheeze  HEART: Regular rate and rhythm; No murmurs, rubs, or gallops  ABDOMEN: Soft, Nontender, Nondistended; Bowel sounds present  EXTREMITIES:  2+ Peripheral Pulses, No clubbing, cyanosis, or edema  PSYCH: AAOx3  NEUROLOGY: non-focal  SKIN: No rashes or lesions    LABS:        TPro  6.9  /  Alb  3.6  /  TBili  1.2  /  DBili  0.2  /  AST  46<H>  /  ALT  8   /  AlkPhos  63  06-23              RADIOLOGY & ADDITIONAL TESTS:    Imaging Personally Reviewed:    Consultant(s) Notes Reviewed:      Care Discussed with Consultants/Other Providers: Patient is a 77y old  Male who presents with a chief complaint of agitation, visual hallucinations, urinary incontinence of 1 day in duration (22 Jun 2017 14:26)      SUBJECTIVE / OVERNIGHT EVENTS:  Overnight the patient did not have any acute events. Patient had pain with swallowing this morning. He endorsed having difficulty the last week, but it was worse overnight. No fevers, chills or sweats. Breathes with mouth open. Has constipation. Described as burning pain. No feeling of food being stuck. Difficulty with both solids and liquids. Per RN appears that patient is able to swallow pills, but takes time. Has a dry mouth.       MEDICATIONS  (STANDING):  carbidopa/levodopa  25/100 1Tablet(s) Oral every 8 hours  senna 2Tablet(s) Oral at bedtime  docusate sodium 100milliGRAM(s) Oral three times a day    MEDICATIONS  (PRN):  acetaminophen   Tablet 650milliGRAM(s) Oral every 6 hours PRN For Temp greater than 38 C (100.4 F)  acetaminophen   Tablet. 650milliGRAM(s) Oral every 6 hours PRN Mild to Moderate Pain (1-6)  bisacodyl Suppository 10milliGRAM(s) Rectal daily PRN Constipation      Vital Signs Last 24 Hrs  T(C): 36.4, Max: 37.8 (06-23 @ 15:39)  HR: 85 (71 - 85)  BP: 134/85 (107/64 - 134/85)  RR: 17 (17 - 18)  SpO2: 98% (98% - 99%)  Wt(kg): --  CAPILLARY BLOOD GLUCOSE      PHYSICAL EXAM:  GENERAL: NAD, well-developed  HEAD:  Atraumatic, Normocephalic  Mouth: No obvious adenopathy  CHEST/LUNG: Clear to auscultation bilaterally; No wheeze, rales or rhonchi  HEART: Regular rate and rhythm; No murmurs, rubs, or gallops  ABDOMEN: Soft, Nontender, Nondistended; Bowel sounds present, no guarding or rigidity  EXTREMITIES:  No edema  PSYCH: Awake, alert  NEUROLOGY: cogwheeling  SKIN: No rashes or lesions    LABS:    TPro  6.9  /  Alb  3.6  /  TBili  1.2  /  DBili  0.2  /  AST  46<H>  /  ALT  8   /  AlkPhos  63  06-23

## 2017-06-24 NOTE — PROGRESS NOTE ADULT - ASSESSMENT
77M [facility] Parkinson's disease (s/p stem cell transplant currently in an FDA trial) presented with agitation, visual hallucinations, urinary incontinence and a fall currently likely 2/2 to worsening Parkinson's disease with possible underlying autonomic instablity.

## 2017-06-25 LAB
BACTERIA BLD CULT: SIGNIFICANT CHANGE UP
BACTERIA BLD CULT: SIGNIFICANT CHANGE UP

## 2017-06-26 LAB
BACTERIA CSF CULT: SIGNIFICANT CHANGE UP
BACTERIA CSF CULT: SIGNIFICANT CHANGE UP

## 2017-08-29 PROBLEM — Z00.00 ENCOUNTER FOR PREVENTIVE HEALTH EXAMINATION: Status: ACTIVE | Noted: 2017-08-29

## 2017-09-07 ENCOUNTER — APPOINTMENT (OUTPATIENT)
Dept: MRI IMAGING | Facility: IMAGING CENTER | Age: 77
End: 2017-09-07
Payer: MEDICARE

## 2017-09-07 ENCOUNTER — OUTPATIENT (OUTPATIENT)
Dept: OUTPATIENT SERVICES | Facility: HOSPITAL | Age: 77
LOS: 1 days | End: 2017-09-07
Payer: MEDICARE

## 2017-09-07 DIAGNOSIS — Z00.8 ENCOUNTER FOR OTHER GENERAL EXAMINATION: ICD-10-CM

## 2017-09-07 PROCEDURE — A9585: CPT

## 2017-09-07 PROCEDURE — 70551 MRI BRAIN STEM W/O DYE: CPT

## 2017-09-07 PROCEDURE — 70551 MRI BRAIN STEM W/O DYE: CPT | Mod: 26

## 2017-11-20 NOTE — ED PROVIDER NOTE - ATTESTATION, MLM
home
I have reviewed and confirmed nurses' notes for patient's medications, allergies, medical history, and surgical history.

## 2018-10-18 ENCOUNTER — INPATIENT (INPATIENT)
Facility: HOSPITAL | Age: 78
LOS: 9 days | End: 2018-10-28
Attending: INTERNAL MEDICINE | Admitting: INTERNAL MEDICINE
Payer: MEDICARE

## 2018-10-18 VITALS
SYSTOLIC BLOOD PRESSURE: 137 MMHG | DIASTOLIC BLOOD PRESSURE: 83 MMHG | OXYGEN SATURATION: 100 % | RESPIRATION RATE: 16 BRPM | HEART RATE: 76 BPM | TEMPERATURE: 98 F

## 2018-10-18 DIAGNOSIS — G93.40 ENCEPHALOPATHY, UNSPECIFIED: ICD-10-CM
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
## 2018-10-18 DIAGNOSIS — R62.7 ADULT FAILURE TO THRIVE: ICD-10-CM

## 2018-10-18 DIAGNOSIS — G91.2 (IDIOPATHIC) NORMAL PRESSURE HYDROCEPHALUS: ICD-10-CM

## 2018-10-18 DIAGNOSIS — D72.829 ELEVATED WHITE BLOOD CELL COUNT, UNSPECIFIED: ICD-10-CM

## 2018-10-18 DIAGNOSIS — Z29.9 ENCOUNTER FOR PROPHYLACTIC MEASURES, UNSPECIFIED: ICD-10-CM

## 2018-10-18 DIAGNOSIS — G20 PARKINSON'S DISEASE: ICD-10-CM

## 2018-10-18 LAB
ALBUMIN SERPL ELPH-MCNC: 3.9 G/DL — SIGNIFICANT CHANGE UP (ref 3.3–5)
ALP SERPL-CCNC: 109 U/L — SIGNIFICANT CHANGE UP (ref 40–120)
ALT FLD-CCNC: 16 U/L — SIGNIFICANT CHANGE UP (ref 4–41)
APPEARANCE UR: CLEAR — SIGNIFICANT CHANGE UP
APTT BLD: 28.8 SEC — SIGNIFICANT CHANGE UP (ref 27.5–37.4)
AST SERPL-CCNC: 21 U/L — SIGNIFICANT CHANGE UP (ref 4–40)
B PERT DNA SPEC QL NAA+PROBE: SIGNIFICANT CHANGE UP
BACTERIA # UR AUTO: SIGNIFICANT CHANGE UP
BASE EXCESS BLDV CALC-SCNC: 4.6 MMOL/L — SIGNIFICANT CHANGE UP
BASOPHILS # BLD AUTO: 0.03 K/UL — SIGNIFICANT CHANGE UP (ref 0–0.2)
BASOPHILS NFR BLD AUTO: 0.2 % — SIGNIFICANT CHANGE UP (ref 0–2)
BILIRUB SERPL-MCNC: 1.4 MG/DL — HIGH (ref 0.2–1.2)
BILIRUB UR-MCNC: NEGATIVE — SIGNIFICANT CHANGE UP
BLOOD GAS VENOUS - CREATININE: 0.79 MG/DL — SIGNIFICANT CHANGE UP (ref 0.5–1.3)
BLOOD UR QL VISUAL: NEGATIVE — SIGNIFICANT CHANGE UP
BUN SERPL-MCNC: 14 MG/DL — SIGNIFICANT CHANGE UP (ref 7–23)
C PNEUM DNA SPEC QL NAA+PROBE: NOT DETECTED — SIGNIFICANT CHANGE UP
CALCIUM SERPL-MCNC: 9.1 MG/DL — SIGNIFICANT CHANGE UP (ref 8.4–10.5)
CHLORIDE BLDV-SCNC: 106 MMOL/L — SIGNIFICANT CHANGE UP (ref 96–108)
CHLORIDE SERPL-SCNC: 101 MMOL/L — SIGNIFICANT CHANGE UP (ref 98–107)
CO2 SERPL-SCNC: 25 MMOL/L — SIGNIFICANT CHANGE UP (ref 22–31)
COLOR SPEC: YELLOW — SIGNIFICANT CHANGE UP
CREAT SERPL-MCNC: 0.84 MG/DL — SIGNIFICANT CHANGE UP (ref 0.5–1.3)
EOSINOPHIL # BLD AUTO: 0 K/UL — SIGNIFICANT CHANGE UP (ref 0–0.5)
EOSINOPHIL NFR BLD AUTO: 0 % — SIGNIFICANT CHANGE UP (ref 0–6)
FLUAV H1 2009 PAND RNA SPEC QL NAA+PROBE: NOT DETECTED — SIGNIFICANT CHANGE UP
FLUAV H1 RNA SPEC QL NAA+PROBE: NOT DETECTED — SIGNIFICANT CHANGE UP
FLUAV H3 RNA SPEC QL NAA+PROBE: NOT DETECTED — SIGNIFICANT CHANGE UP
FLUAV SUBTYP SPEC NAA+PROBE: SIGNIFICANT CHANGE UP
FLUBV RNA SPEC QL NAA+PROBE: NOT DETECTED — SIGNIFICANT CHANGE UP
GAS PNL BLDV: 133 MMOL/L — LOW (ref 136–146)
GLUCOSE BLDV-MCNC: 101 — HIGH (ref 70–99)
GLUCOSE SERPL-MCNC: 98 MG/DL — SIGNIFICANT CHANGE UP (ref 70–99)
GLUCOSE UR-MCNC: NEGATIVE — SIGNIFICANT CHANGE UP
HADV DNA SPEC QL NAA+PROBE: NOT DETECTED — SIGNIFICANT CHANGE UP
HCO3 BLDV-SCNC: 26 MMOL/L — SIGNIFICANT CHANGE UP (ref 20–27)
HCOV 229E RNA SPEC QL NAA+PROBE: NOT DETECTED — SIGNIFICANT CHANGE UP
HCOV HKU1 RNA SPEC QL NAA+PROBE: NOT DETECTED — SIGNIFICANT CHANGE UP
HCOV NL63 RNA SPEC QL NAA+PROBE: NOT DETECTED — SIGNIFICANT CHANGE UP
HCOV OC43 RNA SPEC QL NAA+PROBE: NOT DETECTED — SIGNIFICANT CHANGE UP
HCT VFR BLD CALC: 42.7 % — SIGNIFICANT CHANGE UP (ref 39–50)
HCT VFR BLDV CALC: 47.2 % — SIGNIFICANT CHANGE UP (ref 39–51)
HGB BLD-MCNC: 14.5 G/DL — SIGNIFICANT CHANGE UP (ref 13–17)
HGB BLDV-MCNC: 15.4 G/DL — SIGNIFICANT CHANGE UP (ref 13–17)
HMPV RNA SPEC QL NAA+PROBE: NOT DETECTED — SIGNIFICANT CHANGE UP
HPIV1 RNA SPEC QL NAA+PROBE: NOT DETECTED — SIGNIFICANT CHANGE UP
HPIV2 RNA SPEC QL NAA+PROBE: NOT DETECTED — SIGNIFICANT CHANGE UP
HPIV3 RNA SPEC QL NAA+PROBE: NOT DETECTED — SIGNIFICANT CHANGE UP
HPIV4 RNA SPEC QL NAA+PROBE: NOT DETECTED — SIGNIFICANT CHANGE UP
HYALINE CASTS # UR AUTO: SIGNIFICANT CHANGE UP
IMM GRANULOCYTES # BLD AUTO: 0.08 # — SIGNIFICANT CHANGE UP
IMM GRANULOCYTES NFR BLD AUTO: 0.5 % — SIGNIFICANT CHANGE UP (ref 0–1.5)
INR BLD: 1.06 — SIGNIFICANT CHANGE UP (ref 0.88–1.17)
KETONES UR-MCNC: SIGNIFICANT CHANGE UP
LACTATE BLDV-MCNC: 1.5 MMOL/L — SIGNIFICANT CHANGE UP (ref 0.5–2)
LEUKOCYTE ESTERASE UR-ACNC: NEGATIVE — SIGNIFICANT CHANGE UP
LYMPHOCYTES # BLD AUTO: 0.93 K/UL — LOW (ref 1–3.3)
LYMPHOCYTES # BLD AUTO: 5.3 % — LOW (ref 13–44)
M PNEUMO DNA SPEC QL NAA+PROBE: NOT DETECTED — SIGNIFICANT CHANGE UP
MCHC RBC-ENTMCNC: 30 PG — SIGNIFICANT CHANGE UP (ref 27–34)
MCHC RBC-ENTMCNC: 34 % — SIGNIFICANT CHANGE UP (ref 32–36)
MCV RBC AUTO: 88.2 FL — SIGNIFICANT CHANGE UP (ref 80–100)
MONOCYTES # BLD AUTO: 1.15 K/UL — HIGH (ref 0–0.9)
MONOCYTES NFR BLD AUTO: 6.6 % — SIGNIFICANT CHANGE UP (ref 2–14)
NEUTROPHILS # BLD AUTO: 15.36 K/UL — HIGH (ref 1.8–7.4)
NEUTROPHILS NFR BLD AUTO: 87.4 % — HIGH (ref 43–77)
NITRITE UR-MCNC: NEGATIVE — SIGNIFICANT CHANGE UP
NRBC # FLD: 0 — SIGNIFICANT CHANGE UP
PCO2 BLDV: 47 MMHG — SIGNIFICANT CHANGE UP (ref 41–51)
PH BLDV: 7.41 PH — SIGNIFICANT CHANGE UP (ref 7.32–7.43)
PH UR: 6 — SIGNIFICANT CHANGE UP (ref 5–8)
PLATELET # BLD AUTO: 191 K/UL — SIGNIFICANT CHANGE UP (ref 150–400)
PMV BLD: 10.8 FL — SIGNIFICANT CHANGE UP (ref 7–13)
PO2 BLDV: 24 MMHG — LOW (ref 35–40)
POTASSIUM BLDV-SCNC: 3.7 MMOL/L — SIGNIFICANT CHANGE UP (ref 3.4–4.5)
POTASSIUM SERPL-MCNC: 4 MMOL/L — SIGNIFICANT CHANGE UP (ref 3.5–5.3)
POTASSIUM SERPL-SCNC: 4 MMOL/L — SIGNIFICANT CHANGE UP (ref 3.5–5.3)
PROT SERPL-MCNC: 7.2 G/DL — SIGNIFICANT CHANGE UP (ref 6–8.3)
PROT UR-MCNC: 20 — SIGNIFICANT CHANGE UP
PROTHROM AB SERPL-ACNC: 11.8 SEC — SIGNIFICANT CHANGE UP (ref 9.8–13.1)
RBC # BLD: 4.84 M/UL — SIGNIFICANT CHANGE UP (ref 4.2–5.8)
RBC # FLD: 12.7 % — SIGNIFICANT CHANGE UP (ref 10.3–14.5)
RBC CASTS # UR COMP ASSIST: SIGNIFICANT CHANGE UP (ref 0–?)
RSV RNA SPEC QL NAA+PROBE: NOT DETECTED — SIGNIFICANT CHANGE UP
RV+EV RNA SPEC QL NAA+PROBE: NOT DETECTED — SIGNIFICANT CHANGE UP
SAO2 % BLDV: 39.9 % — LOW (ref 60–85)
SODIUM SERPL-SCNC: 140 MMOL/L — SIGNIFICANT CHANGE UP (ref 135–145)
SP GR SPEC: 1.03 — SIGNIFICANT CHANGE UP (ref 1–1.04)
SQUAMOUS # UR AUTO: SIGNIFICANT CHANGE UP
UROBILINOGEN FLD QL: NORMAL — SIGNIFICANT CHANGE UP
WBC # BLD: 17.55 K/UL — HIGH (ref 3.8–10.5)
WBC # FLD AUTO: 17.55 K/UL — HIGH (ref 3.8–10.5)
WBC UR QL: SIGNIFICANT CHANGE UP (ref 0–?)

## 2018-10-18 PROCEDURE — 71045 X-RAY EXAM CHEST 1 VIEW: CPT | Mod: 26

## 2018-10-18 PROCEDURE — 99223 1ST HOSP IP/OBS HIGH 75: CPT

## 2018-10-18 PROCEDURE — 70450 CT HEAD/BRAIN W/O DYE: CPT | Mod: 26

## 2018-10-18 RX ORDER — CARBIDOPA AND LEVODOPA 25; 100 MG/1; MG/1
1 TABLET ORAL DAILY
Qty: 0 | Refills: 0 | Status: DISCONTINUED | OUTPATIENT
Start: 2018-10-18 | End: 2018-10-28

## 2018-10-18 RX ORDER — CHOLECALCIFEROL (VITAMIN D3) 125 MCG
1 CAPSULE ORAL
Qty: 0 | Refills: 0 | COMMUNITY

## 2018-10-18 RX ORDER — VITAMIN E 100 UNIT
1 CAPSULE ORAL
Qty: 0 | Refills: 0 | COMMUNITY

## 2018-10-18 RX ORDER — UBIDECARENONE 100 MG
1 CAPSULE ORAL
Qty: 0 | Refills: 0 | COMMUNITY

## 2018-10-18 RX ORDER — ACETAMINOPHEN 500 MG
650 TABLET ORAL EVERY 6 HOURS
Qty: 0 | Refills: 0 | Status: DISCONTINUED | OUTPATIENT
Start: 2018-10-18 | End: 2018-10-28

## 2018-10-18 RX ORDER — UBIDECARENONE 100 MG
3 CAPSULE ORAL
Qty: 0 | Refills: 0 | COMMUNITY

## 2018-10-18 RX ORDER — HEPARIN SODIUM 5000 [USP'U]/ML
5000 INJECTION INTRAVENOUS; SUBCUTANEOUS EVERY 8 HOURS
Qty: 0 | Refills: 0 | Status: DISCONTINUED | OUTPATIENT
Start: 2018-10-18 | End: 2018-10-23

## 2018-10-18 RX ORDER — SODIUM CHLORIDE 9 MG/ML
1000 INJECTION INTRAMUSCULAR; INTRAVENOUS; SUBCUTANEOUS ONCE
Qty: 0 | Refills: 0 | Status: COMPLETED | OUTPATIENT
Start: 2018-10-18 | End: 2018-10-18

## 2018-10-18 RX ORDER — INFLUENZA VIRUS VACCINE 15; 15; 15; 15 UG/.5ML; UG/.5ML; UG/.5ML; UG/.5ML
0.5 SUSPENSION INTRAMUSCULAR ONCE
Qty: 0 | Refills: 0 | Status: DISCONTINUED | OUTPATIENT
Start: 2018-10-18 | End: 2018-10-28

## 2018-10-18 RX ADMIN — Medication 650 MILLIGRAM(S): at 22:43

## 2018-10-18 RX ADMIN — SODIUM CHLORIDE 1000 MILLILITER(S): 9 INJECTION INTRAMUSCULAR; INTRAVENOUS; SUBCUTANEOUS at 16:03

## 2018-10-18 RX ADMIN — CARBIDOPA AND LEVODOPA 1 TABLET(S): 25; 100 TABLET ORAL at 21:32

## 2018-10-18 RX ADMIN — SODIUM CHLORIDE 1000 MILLILITER(S): 9 INJECTION INTRAMUSCULAR; INTRAVENOUS; SUBCUTANEOUS at 11:48

## 2018-10-18 RX ADMIN — Medication 650 MILLIGRAM(S): at 22:03

## 2018-10-18 NOTE — ED PROVIDER NOTE - PHYSICAL EXAMINATION
*Gen: NAD, AAO*2  *HEENT: NC/AT, PERRL, MMM, airway patent, trachea midline  *CV: RRR, S1/S2 present, no murmurs/rubs/gallops  *Resp: no respiratory distress, LCTAB, no wheezing/rales/rhonchi  *Abd: non-distended, soft N/Tx4, no guarding or rigidity  *Neuro: CN II-XII intact, no focal neurological deficits, motor and sensory intact bilaterally, 5/5 strength in all extremities  *Extremities: no gross deformity; lumbar paraspinal tenderness  *Skin: no rashes, no wounds   ~ Joey Caal M.D.

## 2018-10-18 NOTE — H&P ADULT - NSHPPHYSICALEXAM_GEN_ALL_CORE
Vital Signs Last 24 Hrs  T(C): 37.1 (18 Oct 2018 15:18), Max: 37.7 (18 Oct 2018 10:18)  T(F): 98.7 (18 Oct 2018 15:18), Max: 99.8 (18 Oct 2018 10:18)  HR: 78 (18 Oct 2018 15:18) (70 - 78)  BP: 141/67 (18 Oct 2018 15:18) (137/83 - 152/76)  BP(mean): --  RR: 16 (18 Oct 2018 15:18) (16 - 18)  SpO2: 100% (18 Oct 2018 15:18) (99% - 100%)    General: NAD, stiff body habitus, minimally verbal  HEENT: EOMI, no conjunctival pallor, MMM, no JVD, no thyromegaly, neck supple, trachea midline  CV: S1S2 RRR no MRG  Lungs: CTA BL  Abdomen: soft NTND +BS   Extremities: No CCE +WWP  Neuro: awake, alert, rigid body habitus

## 2018-10-18 NOTE — CONSULT NOTE ADULT - SUBJECTIVE AND OBJECTIVE BOX
Neurology Consult    Reason for consult: Fall    HPI:  78y Male PMHx Parkinson's disease for inability to ambulate/falls. Hx obtained from spouse and patient at bedside. Patient has had worsening falls(3 times over the past three days) as well as increased difficulty with ambulation; during the most recent fall, patient went at stiff as a board and collapsed, spouse had to call police in order to help patient back. Over the past few weeks, patient has also been progressively more confused; as per spouse, patient was always confused but recently has been unable to follow commands- she told spouse to sit in chair but spouse didn't know how, spouse tried to walk to bed but ended up walking into wall. Patient has also had increased difficulty going to the bathroom over the past few days, has felt increased urgency with the inability to void.     REVIEW OF SYSTEMS:  As above    MEDICATIONS    PMH: Parkinson disease  Kidney stone    PSH: History of appendectomy  Abdominal adhesions    FAMILY HISTORY:  No pertinent family history in first degree relatives    No history of dementia, strokes, or seizures     SOCIAL HISTORY:  No history of tobacco or alcohol use     Allergies    No Known Allergies    Intolerances    Vital Signs Last 24 Hrs  T(C): 37.1 (18 Oct 2018 15:18), Max: 37.7 (18 Oct 2018 10:18)  T(F): 98.7 (18 Oct 2018 15:18), Max: 99.8 (18 Oct 2018 10:18)  HR: 78 (18 Oct 2018 15:18) (70 - 78)  BP: 141/67 (18 Oct 2018 15:18) (137/83 - 152/76)  BP(mean): --  RR: 16 (18 Oct 2018 15:18) (16 - 18)  SpO2: 100% (18 Oct 2018 15:18) (99% - 100%)    Neurological Examination:    Mental Status: Patient is alert, awake, oriented X2(doesnt know month or year). Patient is fluent, no dysarthria, no aphasia. Follows commands well and able to answer questions appropriately. Mood and affect normal.    Cranial Nerves: PERRL, EOMI, visual field intact, V1-V3 intact, no gross facial asymmetry, tongue/uvula midline    Motor Exam: No drift  Right upper extremity: 5/5  Left upper extremity: 5/5  Right lower extremity: 5/5  Left lower extremity: 5/5    Normal bulk/tone    Sensory: Intact to light touch bilaterally. No extinction    Coordination: Finger to nose intact bilaterally     Reflexes: Bilateral 1+ Biceps, Brachial, Patellar, Ankle  Plantar: Equivocal bilaterally    GENERAL: No acute distress  HEENT:  Normocephalic, atraumatic  EXTREMITIES: No edema, clubbing, cyanosis  MUSCULOSKELETAL: Normal range of motion  SKIN: No rashes    LABS:  CBC Full  -  ( 18 Oct 2018 10:05 )  WBC Count : 17.55 K/uL  Hemoglobin : 14.5 g/dL  Hematocrit : 42.7 %  Platelet Count - Automated : 191 K/uL  Mean Cell Volume : 88.2 fL  Mean Cell Hemoglobin : 30.0 pg  Mean Cell Hemoglobin Concentration : 34.0 %  Auto Neutrophil # : 15.36 K/uL  Auto Lymphocyte # : 0.93 K/uL  Auto Monocyte # : 1.15 K/uL  Auto Eosinophil # : 0.00 K/uL  Auto Basophil # : 0.03 K/uL  Auto Neutrophil % : 87.4 %  Auto Lymphocyte % : 5.3 %  Auto Monocyte % : 6.6 %  Auto Eosinophil % : 0.0 %  Auto Basophil % : 0.2 %    Urinalysis Basic - ( 18 Oct 2018 10:21 )    Color: YELLOW / Appearance: CLEAR / S.028 / pH: 6.0  Gluc: NEGATIVE / Ketone: SMALL  / Bili: NEGATIVE / Urobili: NORMAL   Blood: NEGATIVE / Protein: 20 / Nitrite: NEGATIVE   Leuk Esterase: NEGATIVE / RBC: 3-5 / WBC 0-2   Sq Epi: OCC / Non Sq Epi: x / Bacteria: FEW      10-18    140  |  101  |  14  ----------------------------<  98  4.0   |  25  |  0.84    Ca    9.1      18 Oct 2018 10:05    TPro  7.2  /  Alb  3.9  /  TBili  1.4<H>  /  DBili  x   /  AST  21  /  ALT  16  /  AlkPhos  109  10-18    LIVER FUNCTIONS - ( 18 Oct 2018 10:05 )  Alb: 3.9 g/dL / Pro: 7.2 g/dL / ALK PHOS: 109 u/L / ALT: 16 u/L / AST: 21 u/L / GGT: x           Hemoglobin A1C:       PT/INR - ( 18 Oct 2018 10:05 )   PT: 11.8 SEC;   INR: 1.06          PTT - ( 18 Oct 2018 10:05 )  PTT:28.8 SEC    RADIOLOGY:  CT head:  MRI: Neurology Consult    Reason for consult: Fall    HPI:  78y Male PMHx Parkinson's disease for inability to ambulate/falls. Hx obtained from spouse and patient at bedside. Patient has had worsening falls(3 times over the past three days) as well as increased difficulty with ambulation; during the most recent fall, patient went at stiff as a board and collapsed, spouse had to call police in order to help patient back. Over the past few weeks, patient has also been progressively more confused; as per spouse, patient was always confused but recently has been unable to follow commands- she told spouse to sit in chair but spouse didn't know how, spouse tried to walk to bed but ended up walking into wall. Patient has also had increased difficulty going to the bathroom over the past few days, has felt increased urgency with the inability to void. As per spouse, patient saw neurologist this past Monday, was scheduled to have an MRI head the following week.     REVIEW OF SYSTEMS:  As above    MEDICATIONS    PMH: Parkinson disease  Kidney stone    PSH: History of appendectomy  Abdominal adhesions    FAMILY HISTORY:  No pertinent family history in first degree relatives    No history of dementia, strokes, or seizures     SOCIAL HISTORY:  No history of tobacco or alcohol use     Allergies    No Known Allergies    Intolerances    Vital Signs Last 24 Hrs  T(C): 37.1 (18 Oct 2018 15:18), Max: 37.7 (18 Oct 2018 10:18)  T(F): 98.7 (18 Oct 2018 15:18), Max: 99.8 (18 Oct 2018 10:18)  HR: 78 (18 Oct 2018 15:18) (70 - 78)  BP: 141/67 (18 Oct 2018 15:18) (137/83 - 152/76)  BP(mean): --  RR: 16 (18 Oct 2018 15:18) (16 - 18)  SpO2: 100% (18 Oct 2018 15:18) (99% - 100%)    Neurological Examination:    Mental Status: Patient is alert, awake, oriented X2(doesnt know month or year). Patient is fluent, no dysarthria, no aphasia. Follows commands well and able to answer questions appropriately. Mood and affect normal.    Cranial Nerves: PERRL, EOMI, visual field intact, V1-V3 intact, no gross facial asymmetry, tongue/uvula midline    Motor Exam: No drift  Right upper extremity: 5/5  Left upper extremity: 5/5  Right lower extremity: 5/5  Left lower extremity: 5/5    Normal bulk/tone    Sensory: Intact to light touch bilaterally. No extinction    Coordination: Finger to nose intact bilaterally     Reflexes: Bilateral 1+ Biceps, Brachial, Patellar, Ankle  Plantar: Equivocal bilaterally    GENERAL: No acute distress  HEENT:  Normocephalic, atraumatic  EXTREMITIES: No edema, clubbing, cyanosis  MUSCULOSKELETAL: Normal range of motion  SKIN: No rashes    LABS:  CBC Full  -  ( 18 Oct 2018 10:05 )  WBC Count : 17.55 K/uL  Hemoglobin : 14.5 g/dL  Hematocrit : 42.7 %  Platelet Count - Automated : 191 K/uL  Mean Cell Volume : 88.2 fL  Mean Cell Hemoglobin : 30.0 pg  Mean Cell Hemoglobin Concentration : 34.0 %  Auto Neutrophil # : 15.36 K/uL  Auto Lymphocyte # : 0.93 K/uL  Auto Monocyte # : 1.15 K/uL  Auto Eosinophil # : 0.00 K/uL  Auto Basophil # : 0.03 K/uL  Auto Neutrophil % : 87.4 %  Auto Lymphocyte % : 5.3 %  Auto Monocyte % : 6.6 %  Auto Eosinophil % : 0.0 %  Auto Basophil % : 0.2 %    Urinalysis Basic - ( 18 Oct 2018 10:21 )    Color: YELLOW / Appearance: CLEAR / S.028 / pH: 6.0  Gluc: NEGATIVE / Ketone: SMALL  / Bili: NEGATIVE / Urobili: NORMAL   Blood: NEGATIVE / Protein: 20 / Nitrite: NEGATIVE   Leuk Esterase: NEGATIVE / RBC: 3-5 / WBC 0-2   Sq Epi: OCC / Non Sq Epi: x / Bacteria: FEW      10-18    140  |  101  |  14  ----------------------------<  98  4.0   |  25  |  0.84    Ca    9.1      18 Oct 2018 10:05    TPro  7.2  /  Alb  3.9  /  TBili  1.4<H>  /  DBili  x   /  AST  21  /  ALT  16  /  AlkPhos  109  10-18    LIVER FUNCTIONS - ( 18 Oct 2018 10:05 )  Alb: 3.9 g/dL / Pro: 7.2 g/dL / ALK PHOS: 109 u/L / ALT: 16 u/L / AST: 21 u/L / GGT: x           Hemoglobin A1C:       PT/INR - ( 18 Oct 2018 10:05 )   PT: 11.8 SEC;   INR: 1.06          PTT - ( 18 Oct 2018 10:05 )  PTT:28.8 SEC    RADIOLOGY:  CT head:  MRI:

## 2018-10-18 NOTE — H&P ADULT - PROBLEM SELECTOR PLAN 2
As per wife, patient more confused, similar to fall, unclear if confusion is related to chronic PD versus infectious etiology. C/w infectious workup

## 2018-10-18 NOTE — CONSULT NOTE ADULT - ATTENDING COMMENTS
Patient seen and examined with neurology resident and above note reviewed and I agree with assessment and plan as outlined. Patient with parkinson's disease and has been falling more lately and losing balance. He has also been having worsening mobility and ambulation difficulty.   He is only taking sinemet 25/100 1 tab daily.  He demonstrates a lot of rigidity, and stiffness and bradykinesia and poor movements along with hypophonia.  Would obtain MRI brain to rule out any acute ischemia along with sending blood work and rule out any potential UTI or other infectious etiologies.   PT and OT evaluations and would increase his sinemet 25/100 to 1 tab twice daily for now and then in 1 week can increase to 1 tab TID.  I had discussed with wife the possibility of a high volume tap to assess if his gait improves however she and patient would not be interested in a  shunt if due to NPH symptoms, so not necessary to pursue at this point.     Continue medical management and supportive care and all questions answered.

## 2018-10-18 NOTE — ED ADULT NURSE REASSESSMENT NOTE - NS ED NURSE REASSESS COMMENT FT1
Pt is awake and oriented x 3 is slow to answer questions v/s taken pt c/o of generalized weakness at this time he was placed on a cardiac monitor in NSR.

## 2018-10-18 NOTE — H&P ADULT - ASSESSMENT
78M hx of Parkinson's Disease, Normal Pressure Hydrocephalus being admitted for recurrent mechanical falls and encephalopathy

## 2018-10-18 NOTE — H&P ADULT - NSHPLABSRESULTS_GEN_ALL_CORE
CBC Full  -  ( 18 Oct 2018 10:05 )  WBC Count : 17.55 K/uL  Hemoglobin : 14.5 g/dL  Hematocrit : 42.7 %  Platelet Count - Automated : 191 K/uL  Mean Cell Volume : 88.2 fL  Mean Cell Hemoglobin : 30.0 pg  Mean Cell Hemoglobin Concentration : 34.0 %  Auto Neutrophil # : 15.36 K/uL  Auto Lymphocyte # : 0.93 K/uL  Auto Monocyte # : 1.15 K/uL  Auto Eosinophil # : 0.00 K/uL  Auto Basophil # : 0.03 K/uL  Auto Neutrophil % : 87.4 %  Auto Lymphocyte % : 5.3 %  Auto Monocyte % : 6.6 %  Auto Eosinophil % : 0.0 %  Auto Basophil % : 0.2 %    10-18    140  |  101  |  14  ----------------------------<  98  4.0   |  25  |  0.84    Ca    9.1      18 Oct 2018 10:05    TPro  7.2  /  Alb  3.9  /  TBili  1.4<H>  /  DBili  x   /  AST  21  /  ALT  16  /  AlkPhos  109  10-18    < from: CT Head No Cont (10.18.18 @ 11:37) >  IMPRESSION:  Stable normal pressure hydrocephalus.  < end of copied text >    CXR: no effusions

## 2018-10-18 NOTE — H&P ADULT - PROBLEM SELECTOR PLAN 6
IMPROVE VTE Individual Risk Assessment, Score = 2 c/w HSQ for DVT ppx           RISK                                                          Points  [  ] Previous VTE                                               3  [  ] Thrombophilia                                            2  [  ] Lower limb paresis/paralysis                    2    [  ] Active Cancer (in last 6 months)              2   [ x ] Immobilization > 24 hrs                             1  [  ] ICU/CCU stay > 24 hours                            1  [ x ] Age > 60                                                        1                                            Total Score ______2___

## 2018-10-18 NOTE — ED ADULT NURSE REASSESSMENT NOTE - NS ED NURSE REASSESS COMMENT FT1
covering primary Rn fore break pt is in bed A and Ox 3 in AND family at bedside, feeding pt, pending re-eval/dispo at this time.

## 2018-10-18 NOTE — ED PROVIDER NOTE - MEDICAL DECISION MAKING DETAILS
78 year-old male with history of Parkinson's disease presents to the Emergency Department for inability to ambulate/falls; concern for worsening PD, intracranial process/mass, infectious etiology, metabolic derangements.  Plan to assess with labs, CT, CXR, EKG, UA and likely admit for FTT.

## 2018-10-18 NOTE — ED ADULT NURSE NOTE - NSIMPLEMENTINTERV_GEN_ALL_ED
Implemented All Fall with Harm Risk Interventions:  [place] to call system. Call bell, personal items and telephone within reach. Instruct patient to call for assistance. Room bathroom lighting operational. Non-slip footwear when patient is off stretcher. Physically safe environment: no spills, clutter or unnecessary equipment. Stretcher in lowest position, wheels locked, appropriate side rails in place. Provide visual cue, wrist band, yellow gown, etc. Monitor gait and stability. Monitor for mental status changes and reorient to person, place, and time. Review medications for side effects contributing to fall risk. Reinforce activity limits and safety measures with patient and family. Provide visual clues: red socks.

## 2018-10-18 NOTE — H&P ADULT - PROBLEM SELECTOR PLAN 1
s/p 3 falls, unclear etiology. Likely progression of Parkinson's however given leukocytosis need to perform infectious workup  - Fall and Aspiration Precautions  - Pending infectious workup  - PT/OT  - Appreciate Neurology recommendations to check MRI MRI head w/ and w/o contrast  - Labs for AM: ESR/CRP, B12, TSH, Folate

## 2018-10-18 NOTE — ED PROVIDER NOTE - OBJECTIVE STATEMENT
78 year-old male with history of Parkinson's disease presents to the Emergency Department for inability to ambulate/falls.  Patient reports fall 2 days ago while at PT; patient mentions he was walking when his "legs froze" and fell down.  No head trauma, LOC, chest pain, shortness of breath.  Patient has repeat episodes of falls during the last 2 days; both mechanical in nature with the same etiology.  Patient is not on AC.  Patient has been having worsening word finding difficulty over the past few months; increased episodes of visual difficulty over the past few months.  Patient has been unable to ambulate with walker for the past 2 days. 78 year-old male with history of Parkinson's disease presents to the Emergency Department for inability to ambulate/falls.  Patient reports fall 2 days ago while at PT; patient mentions he was walking when his "legs froze" and fell down.  No head trauma, LOC, chest pain, shortness of breath.  Subjective fever.  Patient has repeat episodes of falls during the last 2 days; both mechanical in nature with the same etiology.  Patient is not on AC.  Patient has been having worsening word finding difficulty over the past few months; increased episodes of visual difficulty over the past few months.  Patient has been unable to ambulate with walker for the past 2 days.

## 2018-10-18 NOTE — ED ADULT NURSE NOTE - OBJECTIVE STATEMENT
Pt received to room 26 axox3 but has periods of forgetfulness and is slow to answer questions pt c/o of lower back pain s/p falling x three episodes as per his wife.  Pt wife states the pt has been having fever increased weakness with slurred speech which has been getting worse,  Pt c/o of abdominal pain at this time he is able to follow commands but has very little energy.  Pt respirations are even unlabored abdomen is soft non tender.  An iv was accessed labs sent ua/cs sent rectal temp done.

## 2018-10-18 NOTE — CONSULT NOTE ADULT - ASSESSMENT
78y Male PMHx Parkinson's disease for inability to ambulate/falls. Patient with increased urinary urgency, 78y Male PMHx Parkinson's disease for inability to ambulate/falls. Patient with increased urinary urgency, unsteadiness when standing out of proportion with baseline Parkinsons deficits, worsening confusion. CT head with enlarged ventricles, in conjunction with clinical findings, concern for NPH. Confusion also possibly secondary to toxic/metabolic insult(elevated white count) or structural deficit; given clinical condition, additional imaging studies to better evaluate patient's condition warranted at this time.     Recs:  MRI head w/ and w/o contrast  ESR/CRP, B12, TSH, Folate  Contact patient's outpatient neurologist with regards to management of Parkinsons symptoms 78y Male PMHx Parkinson's disease for inability to ambulate/falls. Patient with increased urinary urgency, unsteadiness when standing out of proportion with baseline Parkinsons deficits, worsening confusion. CT head with enlarged ventricles, in conjunction with clinical findings, concern for NPH. Confusion also possibly secondary to toxic/metabolic insult(elevated white count) or structural deficit; given clinical condition, additional imaging studies to better evaluate patient's condition warranted at this time.     Recs:  MRI head w/ and w/o contrast  PT/OT  ESR/CRP, B12, TSH, Folate  Contact patient's outpatient neurologist with regards to management of Parkinsons symptoms 78y Male PMHx Parkinson's disease for inability to ambulate/falls. Patient with increased urinary urgency, unsteadiness when standing out of proportion with baseline Parkinsons deficits, worsening confusion. CT head with enlarged ventricles, in conjunction with clinical findings, concern for NPH. Confusion also possibly secondary to toxic/metabolic insult(elevated white count) or structural deficit; given clinical condition, additional imaging studies to better evaluate patient's condition warranted at this time.     Recs:    MRI head w/ and w/o contrast  PT/OT  ESR/CRP, B12, TSH, Folate  Contact patient's outpatient neurologist Dr. Chan Beaver with regards to management of Parkinsons symptoms

## 2018-10-18 NOTE — ED PROVIDER NOTE - ATTENDING CONTRIBUTION TO CARE
Attending Note (Zach): patient here for eval for frequent falls. as per wife, sharp decline in function x 3 weeks with more frequent falls, word finding difficulty.  has fallen multiple times over last few days. reports patient unable to get out of bed x 3 days which is a change from baseline, is unable to care for himself at all which is a change from baseline.  tender left lateral mid thoracic back.  no spinal tenderness.  r/o ich/fracture/electrolyte abnormality

## 2018-10-18 NOTE — H&P ADULT - HISTORY OF PRESENT ILLNESS
78M hx of Parkinson's Disease, Normal Pressure Hydrocephalus presents to [facility] ED s/p 3 straight days of 3 mechanical falls as well as worsening confusion. Most of hx is taken by wife at bedside, as patient has difficulty with speaking 2/2 neurologic condition. As per wife, he started to clinically do poorly about 2-3 weeks ago when he began behaving abnormally. The patient would have worsening ability to speak, and would act confused. For example, he would confuse the dining room for the bathroom. The patient also has been eating less, and she notices that he has been coughing more lately. In the midst of patient's confusion over the last 3 days the patient has had worsening mobility resulting in 3 falls. On his fall today the wife felt he was "warm." She called EMS for assistance and they had brought him to the ED.    In the ED patient was given 1 liter NS bolus.

## 2018-10-19 DIAGNOSIS — R13.10 DYSPHAGIA, UNSPECIFIED: ICD-10-CM

## 2018-10-19 DIAGNOSIS — R06.1 STRIDOR: ICD-10-CM

## 2018-10-19 LAB
ALBUMIN SERPL ELPH-MCNC: 3.5 G/DL — SIGNIFICANT CHANGE UP (ref 3.3–5)
ALP SERPL-CCNC: 111 U/L — SIGNIFICANT CHANGE UP (ref 40–120)
ALT FLD-CCNC: 8 U/L — SIGNIFICANT CHANGE UP (ref 4–41)
AST SERPL-CCNC: 27 U/L — SIGNIFICANT CHANGE UP (ref 4–40)
BACTERIA UR CULT: SIGNIFICANT CHANGE UP
BASOPHILS # BLD AUTO: 0.02 K/UL — SIGNIFICANT CHANGE UP (ref 0–0.2)
BASOPHILS NFR BLD AUTO: 0.1 % — SIGNIFICANT CHANGE UP (ref 0–2)
BILIRUB SERPL-MCNC: 1.7 MG/DL — HIGH (ref 0.2–1.2)
BUN SERPL-MCNC: 11 MG/DL — SIGNIFICANT CHANGE UP (ref 7–23)
CALCIUM SERPL-MCNC: 8.8 MG/DL — SIGNIFICANT CHANGE UP (ref 8.4–10.5)
CHLORIDE SERPL-SCNC: 100 MMOL/L — SIGNIFICANT CHANGE UP (ref 98–107)
CO2 SERPL-SCNC: 23 MMOL/L — SIGNIFICANT CHANGE UP (ref 22–31)
CREAT SERPL-MCNC: 0.72 MG/DL — SIGNIFICANT CHANGE UP (ref 0.5–1.3)
CRP SERPL-MCNC: 88.5 MG/L — HIGH
EOSINOPHIL # BLD AUTO: 0 K/UL — SIGNIFICANT CHANGE UP (ref 0–0.5)
EOSINOPHIL NFR BLD AUTO: 0 % — SIGNIFICANT CHANGE UP (ref 0–6)
ERYTHROCYTE [SEDIMENTATION RATE] IN BLOOD: 7 MM/HR — SIGNIFICANT CHANGE UP (ref 1–15)
FOLATE SERPL-MCNC: > 20 NG/ML — HIGH (ref 4.7–20)
GLUCOSE SERPL-MCNC: 90 MG/DL — SIGNIFICANT CHANGE UP (ref 70–99)
HCT VFR BLD CALC: 39.6 % — SIGNIFICANT CHANGE UP (ref 39–50)
HGB BLD-MCNC: 13.7 G/DL — SIGNIFICANT CHANGE UP (ref 13–17)
IMM GRANULOCYTES # BLD AUTO: 0.1 # — SIGNIFICANT CHANGE UP
IMM GRANULOCYTES NFR BLD AUTO: 0.6 % — SIGNIFICANT CHANGE UP (ref 0–1.5)
LYMPHOCYTES # BLD AUTO: 0.46 K/UL — LOW (ref 1–3.3)
LYMPHOCYTES # BLD AUTO: 2.7 % — LOW (ref 13–44)
MAGNESIUM SERPL-MCNC: 1.7 MG/DL — SIGNIFICANT CHANGE UP (ref 1.6–2.6)
MCHC RBC-ENTMCNC: 30.6 PG — SIGNIFICANT CHANGE UP (ref 27–34)
MCHC RBC-ENTMCNC: 34.6 % — SIGNIFICANT CHANGE UP (ref 32–36)
MCV RBC AUTO: 88.6 FL — SIGNIFICANT CHANGE UP (ref 80–100)
MONOCYTES # BLD AUTO: 0.89 K/UL — SIGNIFICANT CHANGE UP (ref 0–0.9)
MONOCYTES NFR BLD AUTO: 5.2 % — SIGNIFICANT CHANGE UP (ref 2–14)
NEUTROPHILS # BLD AUTO: 15.69 K/UL — HIGH (ref 1.8–7.4)
NEUTROPHILS NFR BLD AUTO: 91.4 % — HIGH (ref 43–77)
NRBC # FLD: 0 — SIGNIFICANT CHANGE UP
PLATELET # BLD AUTO: 181 K/UL — SIGNIFICANT CHANGE UP (ref 150–400)
PMV BLD: 10.7 FL — SIGNIFICANT CHANGE UP (ref 7–13)
POTASSIUM SERPL-MCNC: 3.7 MMOL/L — SIGNIFICANT CHANGE UP (ref 3.5–5.3)
POTASSIUM SERPL-SCNC: 3.7 MMOL/L — SIGNIFICANT CHANGE UP (ref 3.5–5.3)
PROT SERPL-MCNC: 6.7 G/DL — SIGNIFICANT CHANGE UP (ref 6–8.3)
RBC # BLD: 4.47 M/UL — SIGNIFICANT CHANGE UP (ref 4.2–5.8)
RBC # FLD: 12.5 % — SIGNIFICANT CHANGE UP (ref 10.3–14.5)
SODIUM SERPL-SCNC: 138 MMOL/L — SIGNIFICANT CHANGE UP (ref 135–145)
SPECIMEN SOURCE: SIGNIFICANT CHANGE UP
TSH SERPL-MCNC: 1.37 UIU/ML — SIGNIFICANT CHANGE UP (ref 0.27–4.2)
VIT B12 SERPL-MCNC: 1770 PG/ML — HIGH (ref 200–900)
WBC # BLD: 17.16 K/UL — HIGH (ref 3.8–10.5)
WBC # FLD AUTO: 17.16 K/UL — HIGH (ref 3.8–10.5)

## 2018-10-19 PROCEDURE — 99233 SBSQ HOSP IP/OBS HIGH 50: CPT

## 2018-10-19 PROCEDURE — 99222 1ST HOSP IP/OBS MODERATE 55: CPT | Mod: GC

## 2018-10-19 RX ORDER — PIPERACILLIN AND TAZOBACTAM 4; .5 G/20ML; G/20ML
3.38 INJECTION, POWDER, LYOPHILIZED, FOR SOLUTION INTRAVENOUS EVERY 8 HOURS
Qty: 0 | Refills: 0 | Status: DISCONTINUED | OUTPATIENT
Start: 2018-10-19 | End: 2018-10-25

## 2018-10-19 RX ORDER — VANCOMYCIN HCL 1 G
1000 VIAL (EA) INTRAVENOUS ONCE
Qty: 0 | Refills: 0 | Status: COMPLETED | OUTPATIENT
Start: 2018-10-19 | End: 2018-10-19

## 2018-10-19 RX ADMIN — Medication 650 MILLIGRAM(S): at 15:52

## 2018-10-19 RX ADMIN — Medication 250 MILLIGRAM(S): at 15:28

## 2018-10-19 RX ADMIN — Medication 650 MILLIGRAM(S): at 22:03

## 2018-10-19 RX ADMIN — Medication 650 MILLIGRAM(S): at 09:12

## 2018-10-19 RX ADMIN — CARBIDOPA AND LEVODOPA 1 TABLET(S): 25; 100 TABLET ORAL at 12:52

## 2018-10-19 RX ADMIN — Medication 650 MILLIGRAM(S): at 10:46

## 2018-10-19 RX ADMIN — Medication 650 MILLIGRAM(S): at 15:27

## 2018-10-19 RX ADMIN — HEPARIN SODIUM 5000 UNIT(S): 5000 INJECTION INTRAVENOUS; SUBCUTANEOUS at 22:03

## 2018-10-19 RX ADMIN — PIPERACILLIN AND TAZOBACTAM 25 GRAM(S): 4; .5 INJECTION, POWDER, LYOPHILIZED, FOR SOLUTION INTRAVENOUS at 22:03

## 2018-10-19 NOTE — PHYSICAL THERAPY INITIAL EVALUATION ADULT - IMPAIRMENTS CONTRIBUTING TO GAIT DEVIATIONS, PT EVAL
decreased strength/impaired postural control/impaired coordination/decreased flexibility/impaired balance

## 2018-10-19 NOTE — PROGRESS NOTE ADULT - PROBLEM SELECTOR PLAN 1
s/p 3 falls, unclear etiology. Likely progression of Parkinson's however given leukocytosis need to perform infectious workup  - Fall and Aspiration Precautions  - blood cx, UA, urine cx unremarkable   -RVP negative  - patient febrile with leucocytosis, may be aspiration PNA, will check CT chest   - total br 1.7, mildly elevated, but liver enzymes wnl, will continue to monitor   - start vanco x1 dose and zosyn q 8 hrs   - PT/OT  - Appreciate Neurology recommendations to check MRI MRI head w/ and w/o contrast  - Labs for AM: ESR/CRP, B12, TSH, Folate noted, CRP elevated s/p 3 falls, unclear etiology. Likely progression of Parkinson's however given leukocytosis need to perform infectious workup  - Fall and Aspiration Precautions  - blood cx, UA, urine cx unremarkable   -RVP negative  - patient febrile with leucocytosis, may be aspiration PNA, will check CT chest   - total br 1.7, mildly elevated, but liver enzymes wnl, will continue to monitor   - start vanco x1 dose and zosyn q 8 hrs   - PT/OT  - Appreciate Neurology recommendations to check MRI MRI head w/ and w/o contrast  - Labs for AM: ESR/CRP, B12, TSH, Folate noted, CRP elevated  speech and swallow eval

## 2018-10-19 NOTE — CONSULT NOTE ADULT - SUBJECTIVE AND OBJECTIVE BOX
78 year old male with a history of parkinson's disease that presents to the hospital for   rule out encephelopathy. Patient's wife states he has history of falls and had facial trauma in   the past. Has had difficulty swallowing since that time. Had a swallow test and was started on a   dysphagia diet. Denies any SOB, Dysphonia, hoarseness or other complaints.    AVSS p02 98%  HEENT:  Mouth: no floor of mouth edema or masses. No injection. No exudates.   Neck; submandibular space firm. No masses.   No palpable LN's  Scope;  No BOT edema, No masses  Epiglottis sharp,   Vocal cords mobile and patent.

## 2018-10-19 NOTE — OCCUPATIONAL THERAPY INITIAL EVALUATION ADULT - PLANNED THERAPY INTERVENTIONS, OT EVAL
ROM/transfer training/bed mobility training/cognitive, visual perceptual/neuromuscular re-education/ADL retraining/balance training/fine motor coordination training/motor coordination training

## 2018-10-19 NOTE — OCCUPATIONAL THERAPY INITIAL EVALUATION ADULT - DIAGNOSIS, OT EVAL
UE weakness, decreased orientation, decreased balance, decreased motor planning, decreased functional mobility, decreased ADL independence

## 2018-10-19 NOTE — OCCUPATIONAL THERAPY INITIAL EVALUATION ADULT - PERTINENT HX OF CURRENT PROBLEM, REHAB EVAL
78 M hx of Parkinson's Disease, Normal Pressure Hydrocephalus presents to [facility] ED s/p 3 straight days of 3 mechanical falls as well as worsening confusion. CT Head: Stable normal pressure hydrocephalus.

## 2018-10-19 NOTE — PROGRESS NOTE ADULT - PROBLEM SELECTOR PLAN 6
may be secondary to TMJ dislocation   OMFS was consulted , As per OMFS, given chronicity, no acute intervention is recommended. Recommend pt to F/U outpatient with an oral surgeon.

## 2018-10-19 NOTE — PROGRESS NOTE ADULT - SUBJECTIVE AND OBJECTIVE BOX
Patient is a 78y old  Male who presents with a chief complaint of Repeat Mechanical Falls (18 Oct 2018 17:13)      SUBJECTIVE / OVERNIGHT EVENTS: patient seen and examined by bedside, noted to have a stridor, denies headache, dizziness, SOB, CP, Palpitations , N/V/D, abdominal pain   pt febrile to 101         MEDICATIONS  (STANDING):  artificial tears (preservative free) Ophthalmic Solution 1 Drop(s) Both EYES daily  carbidopa/levodopa  25/100 1 Tablet(s) Oral daily  heparin  Injectable 5000 Unit(s) SubCutaneous every 8 hours  influenza   Vaccine 0.5 milliLiter(s) IntraMuscular once    MEDICATIONS  (PRN):  acetaminophen   Tablet .. 650 milliGRAM(s) Oral every 6 hours PRN Temp greater or equal to 38C (100.4F)      Vital Signs Last 24 Hrs  T(C): 38.1 (19 Oct 2018 11:28), Max: 38.6 (19 Oct 2018 09:09)  T(F): 100.5 (19 Oct 2018 11:28), Max: 101.4 (19 Oct 2018 09:09)  HR: 82 (19 Oct 2018 09:09) (78 - 89)  BP: 134/72 (19 Oct 2018 09:09) (128/72 - 148/89)  BP(mean): --  RR: 20 (19 Oct 2018 09:09) (16 - 20)  SpO2: 95% (19 Oct 2018 09:09) (94% - 100%)  CAPILLARY BLOOD GLUCOSE        I&O's Summary      PHYSICAL EXAM:  GENERAL: NAD  HEAD:  Atraumatic, Normocephalic , jaw seems  to move laterally when pt opens and closes mouth   EYES: EOMI, PERRLA, conjunctiva and sclera clear  NECK: Supple, stridor +   CHEST/LUNG: hard to appreciate as pt  has transmitted sound secondary to stridor    HEART: Regular rate and rhythm;   ABDOMEN: Soft, Nontender, Nondistended; Bowel sounds present  EXTREMITIES:  2+ Peripheral Pulses, No clubbing, cyanosis, or edema  PSYCH: AAOx1-2, flat affect   NEUROLOGY: moving all extremities, slow movement , rigidity+   SKIN: No rashes or lesions    LABS:                        13.7   17.16 )-----------( 181      ( 19 Oct 2018 05:58 )             39.6     10-19    138  |  100  |  11  ----------------------------<  90  3.7   |  23  |  0.72    Ca    8.8      19 Oct 2018 05:58  Mg     1.7     10-19    TPro  6.7  /  Alb  3.5  /  TBili  1.7<H>  /  DBili  x   /  AST  27  /  ALT  8   /  AlkPhos  111  10-19    PT/INR - ( 18 Oct 2018 10:05 )   PT: 11.8 SEC;   INR: 1.06          PTT - ( 18 Oct 2018 10:05 )  PTT:28.8 SEC      Urinalysis Basic - ( 18 Oct 2018 10:21 )    Color: YELLOW / Appearance: CLEAR / S.028 / pH: 6.0  Gluc: NEGATIVE / Ketone: SMALL  / Bili: NEGATIVE / Urobili: NORMAL   Blood: NEGATIVE / Protein: 20 / Nitrite: NEGATIVE   Leuk Esterase: NEGATIVE / RBC: 3-5 / WBC 0-2   Sq Epi: OCC / Non Sq Epi: x / Bacteria: FEW        RADIOLOGY & ADDITIONAL TESTS:    Imaging Personally Reviewed:    Consultant(s) Notes Reviewed:  neurology     Care Discussed with Consultants/Other Providers:

## 2018-10-19 NOTE — PHYSICAL THERAPY INITIAL EVALUATION ADULT - PERTINENT HX OF CURRENT PROBLEM, REHAB EVAL
This is an 78M hx of Parkinson's Disease, Normal Pressure Hydrocephalus being admitted for recurrent mechanical falls and encephalopathy

## 2018-10-19 NOTE — CHART NOTE - NSCHARTNOTEFT_GEN_A_CORE
ENT consult called for stridor.     ADS  18129 ENT consult called for stridor.     Spoke with OMFS regarding possible TMJ dysfunction and intervention. As per OMFS, given chronicity, no acute intervention is recommended. Recommend pt to F/U outpatient with an oral surgeon.     ADS  [id number]

## 2018-10-19 NOTE — PHYSICAL THERAPY INITIAL EVALUATION ADULT - GENERAL OBSERVATIONS, REHAB EVAL
Patient received semi supine in bed, drooling of mouth , tremors of the extremities and snoring while awake ,wife and MD at bedside with the patient.

## 2018-10-19 NOTE — PHYSICAL THERAPY INITIAL EVALUATION ADULT - ADDITIONAL COMMENTS
As per patient's wife patient was independent until 2 wks ago with functional activities and ambulation then got progressively deteriorated.

## 2018-10-19 NOTE — CONSULT NOTE ADULT - PROBLEM SELECTOR RECOMMENDATION 9
1. continue with speech therapy recommendations  2. Consider OMFS consult for prior facial trauma  3. Discussed with resident. scope WNL. no ENT intervention at this time.

## 2018-10-20 LAB
ALBUMIN SERPL ELPH-MCNC: 3.6 G/DL — SIGNIFICANT CHANGE UP (ref 3.3–5)
ALP SERPL-CCNC: 138 U/L — HIGH (ref 40–120)
ALT FLD-CCNC: 34 U/L — SIGNIFICANT CHANGE UP (ref 4–41)
AST SERPL-CCNC: 47 U/L — HIGH (ref 4–40)
BASOPHILS # BLD AUTO: 0.04 K/UL — SIGNIFICANT CHANGE UP (ref 0–0.2)
BASOPHILS NFR BLD AUTO: 0.3 % — SIGNIFICANT CHANGE UP (ref 0–2)
BILIRUB SERPL-MCNC: 1.7 MG/DL — HIGH (ref 0.2–1.2)
BUN SERPL-MCNC: 12 MG/DL — SIGNIFICANT CHANGE UP (ref 7–23)
CALCIUM SERPL-MCNC: 8.9 MG/DL — SIGNIFICANT CHANGE UP (ref 8.4–10.5)
CHLORIDE SERPL-SCNC: 99 MMOL/L — SIGNIFICANT CHANGE UP (ref 98–107)
CO2 SERPL-SCNC: 24 MMOL/L — SIGNIFICANT CHANGE UP (ref 22–31)
CREAT SERPL-MCNC: 0.81 MG/DL — SIGNIFICANT CHANGE UP (ref 0.5–1.3)
EOSINOPHIL # BLD AUTO: 0.07 K/UL — SIGNIFICANT CHANGE UP (ref 0–0.5)
EOSINOPHIL NFR BLD AUTO: 0.5 % — SIGNIFICANT CHANGE UP (ref 0–6)
GLUCOSE SERPL-MCNC: 96 MG/DL — SIGNIFICANT CHANGE UP (ref 70–99)
HCT VFR BLD CALC: 42.2 % — SIGNIFICANT CHANGE UP (ref 39–50)
HGB BLD-MCNC: 14.6 G/DL — SIGNIFICANT CHANGE UP (ref 13–17)
IMM GRANULOCYTES # BLD AUTO: 0.07 # — SIGNIFICANT CHANGE UP
IMM GRANULOCYTES NFR BLD AUTO: 0.5 % — SIGNIFICANT CHANGE UP (ref 0–1.5)
LYMPHOCYTES # BLD AUTO: 0.73 K/UL — LOW (ref 1–3.3)
LYMPHOCYTES # BLD AUTO: 4.8 % — LOW (ref 13–44)
MCHC RBC-ENTMCNC: 30.3 PG — SIGNIFICANT CHANGE UP (ref 27–34)
MCHC RBC-ENTMCNC: 34.6 % — SIGNIFICANT CHANGE UP (ref 32–36)
MCV RBC AUTO: 87.6 FL — SIGNIFICANT CHANGE UP (ref 80–100)
MONOCYTES # BLD AUTO: 0.88 K/UL — SIGNIFICANT CHANGE UP (ref 0–0.9)
MONOCYTES NFR BLD AUTO: 5.8 % — SIGNIFICANT CHANGE UP (ref 2–14)
NEUTROPHILS # BLD AUTO: 13.38 K/UL — HIGH (ref 1.8–7.4)
NEUTROPHILS NFR BLD AUTO: 88.1 % — HIGH (ref 43–77)
NRBC # FLD: 0 — SIGNIFICANT CHANGE UP
PLATELET # BLD AUTO: 199 K/UL — SIGNIFICANT CHANGE UP (ref 150–400)
PMV BLD: 11.3 FL — SIGNIFICANT CHANGE UP (ref 7–13)
POTASSIUM SERPL-MCNC: 3.7 MMOL/L — SIGNIFICANT CHANGE UP (ref 3.5–5.3)
POTASSIUM SERPL-SCNC: 3.7 MMOL/L — SIGNIFICANT CHANGE UP (ref 3.5–5.3)
PROT SERPL-MCNC: 7 G/DL — SIGNIFICANT CHANGE UP (ref 6–8.3)
RBC # BLD: 4.82 M/UL — SIGNIFICANT CHANGE UP (ref 4.2–5.8)
RBC # FLD: 12.3 % — SIGNIFICANT CHANGE UP (ref 10.3–14.5)
SODIUM SERPL-SCNC: 138 MMOL/L — SIGNIFICANT CHANGE UP (ref 135–145)
SPECIMEN SOURCE: SIGNIFICANT CHANGE UP
SPECIMEN SOURCE: SIGNIFICANT CHANGE UP
WBC # BLD: 15.17 K/UL — HIGH (ref 3.8–10.5)
WBC # FLD AUTO: 15.17 K/UL — HIGH (ref 3.8–10.5)

## 2018-10-20 PROCEDURE — 70553 MRI BRAIN STEM W/O & W/DYE: CPT | Mod: 26

## 2018-10-20 PROCEDURE — 99233 SBSQ HOSP IP/OBS HIGH 50: CPT

## 2018-10-20 RX ADMIN — HEPARIN SODIUM 5000 UNIT(S): 5000 INJECTION INTRAVENOUS; SUBCUTANEOUS at 05:57

## 2018-10-20 RX ADMIN — HEPARIN SODIUM 5000 UNIT(S): 5000 INJECTION INTRAVENOUS; SUBCUTANEOUS at 15:54

## 2018-10-20 RX ADMIN — PIPERACILLIN AND TAZOBACTAM 25 GRAM(S): 4; .5 INJECTION, POWDER, LYOPHILIZED, FOR SOLUTION INTRAVENOUS at 05:56

## 2018-10-20 RX ADMIN — PIPERACILLIN AND TAZOBACTAM 25 GRAM(S): 4; .5 INJECTION, POWDER, LYOPHILIZED, FOR SOLUTION INTRAVENOUS at 22:04

## 2018-10-20 RX ADMIN — PIPERACILLIN AND TAZOBACTAM 25 GRAM(S): 4; .5 INJECTION, POWDER, LYOPHILIZED, FOR SOLUTION INTRAVENOUS at 15:55

## 2018-10-20 RX ADMIN — Medication 1 DROP(S): at 15:54

## 2018-10-20 RX ADMIN — CARBIDOPA AND LEVODOPA 1 TABLET(S): 25; 100 TABLET ORAL at 15:55

## 2018-10-20 RX ADMIN — HEPARIN SODIUM 5000 UNIT(S): 5000 INJECTION INTRAVENOUS; SUBCUTANEOUS at 22:05

## 2018-10-20 NOTE — PROGRESS NOTE ADULT - PROBLEM SELECTOR PLAN 1
s/p 3 falls, unclear etiology. Likely progression of Parkinson's however given leukocytosis need to perform infectious workup  - Fall and Aspiration Precautions  - blood cx, UA, urine cx unremarkable   -RVP negative  - patient febrile with leucocytosis, may be aspiration PNA, CT chest recommended, pt and wife refusing, will empirically treat for aspiration PNA as leucocytosis and fever improving   - total br 1.7, mildly elevated, AST 46  will RUQ US    - start vanco x1 dose and zosyn q 8 hrs   - PT/OT  - Neurology recommended to check MRI MRI head w/ and w/o contrast, MRI noted, no acute change   -  will f/u with neurology   speech and swallow eval, aspiration precaution

## 2018-10-20 NOTE — PROGRESS NOTE ADULT - PROBLEM SELECTOR PLAN 6
may be secondary to TMJ dislocation   OMFS was consulted , As per OMFS, given chronicity, no acute intervention is recommended. Recommend pt to F/U outpatient with an oral surgeon.  ENT noted ,no acute intervention

## 2018-10-20 NOTE — PROGRESS NOTE ADULT - SUBJECTIVE AND OBJECTIVE BOX
Patient is a 78y old  Male who presents with a chief complaint of Repeat Mechanical Falls (19 Oct 2018 14:15)      SUBJECTIVE / OVERNIGHT EVENTS: patient seen and examined by bedside at 12:20 PM, pt says he feels about the same. Pt was febrile to 101 lat night, afebrile this morning   As per and wife do not want CT chest       MEDICATIONS  (STANDING):  artificial tears (preservative free) Ophthalmic Solution 1 Drop(s) Both EYES daily  carbidopa/levodopa  25/100 1 Tablet(s) Oral daily  heparin  Injectable 5000 Unit(s) SubCutaneous every 8 hours  influenza   Vaccine 0.5 milliLiter(s) IntraMuscular once  piperacillin/tazobactam IVPB. 3.375 Gram(s) IV Intermittent every 8 hours    MEDICATIONS  (PRN):  acetaminophen   Tablet .. 650 milliGRAM(s) Oral every 6 hours PRN Temp greater or equal to 38C (100.4F)      Vital Signs Last 24 Hrs  T(C): 37.7 (20 Oct 2018 05:55), Max: 38.4 (19 Oct 2018 21:52)  T(F): 99.8 (20 Oct 2018 05:55), Max: 101.2 (19 Oct 2018 21:52)  HR: 83 (20 Oct 2018 05:55) (81 - 83)  BP: 149/82 (20 Oct 2018 05:55) (128/77 - 149/82)  BP(mean): --  RR: 18 (20 Oct 2018 05:55) (18 - 18)  SpO2: 97% (20 Oct 2018 05:55) (95% - 97%)  CAPILLARY BLOOD GLUCOSE        I&O's Summary    PHYSICAL EXAM:  GENERAL: NAD  HEAD:  Atraumatic, Normocephalic , jaw seems  to move laterally when pt opens and closes mouth   EYES: EOMI, PERRLA, conjunctiva and sclera clear  NECK: Supple, stridor +   CHEST/LUNG: hard to appreciate as pt  has transmitted sound secondary to stridor    HEART: Regular rate and rhythm;   ABDOMEN: Soft, Nontender, Nondistended; Bowel sounds present  EXTREMITIES:  2+ Peripheral Pulses, No clubbing, cyanosis, or edema  PSYCH: AAOx1-2, flat affect   NEUROLOGY: moving all extremities, slow movement , rigidity+         LABS:                        14.6   15.17 )-----------( 199      ( 20 Oct 2018 06:18 )             42.2     10-20    138  |  99  |  12  ----------------------------<  96  3.7   |  24  |  0.81    Ca    8.9      20 Oct 2018 06:18  Mg     1.7     10-19    TPro  7.0  /  Alb  3.6  /  TBili  1.7<H>  /  DBili  x   /  AST  47<H>  /  ALT  34  /  AlkPhos  138<H>  10-20              RADIOLOGY & ADDITIONAL TESTS:    Imaging Personally Reviewed:< from: MR Head w/wo IV Cont (10.20.18 @ 12:58) >  FINDINGS:      The ventricles are mild to moderately enlarged, and similar in size to   study from 9/7/2017. This may represent the presence of normal pressure   hydrocephalus.    No mass effect, midline shift, vasogenic edema, acute intracranial   hemorrhage, or acute infarction. 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 Moderate to severe white   matter microvascular ischemic disease is similar. Signal voids are seen   within the major intracranial vessels consistent with their patency.    No abnormal parenchymal orleptomeningeal enhancement.    The visualized paranasal sinuses and mastoid air cells are clear. The   orbits, sellar and suprasellar structures, and craniocervical junction   are unremarkable.    IMPRESSION:    No acute intracranial hemorrhage, masseffect, vasogenic edema, or   evidence of acute territorial infarct.    No abnormal parenchymal or leptomeningeal enhancement.    Ventricles mild to moderately dilated, similar in size to study from   9/7/2017. This may represent the presence of normal pressure   hydrocephalus. Clinical correlation is recommended.      < end of copied text >      Consultant(s) Notes Reviewed:  ENT     Care Discussed with Consultants/Other Providers:

## 2018-10-21 ENCOUNTER — APPOINTMENT (OUTPATIENT)
Dept: MRI IMAGING | Facility: IMAGING CENTER | Age: 78
End: 2018-10-21

## 2018-10-21 LAB
ALBUMIN SERPL ELPH-MCNC: 3.5 G/DL — SIGNIFICANT CHANGE UP (ref 3.3–5)
ALP SERPL-CCNC: 166 U/L — HIGH (ref 40–120)
ALT FLD-CCNC: 30 U/L — SIGNIFICANT CHANGE UP (ref 4–41)
AST SERPL-CCNC: 33 U/L — SIGNIFICANT CHANGE UP (ref 4–40)
BASOPHILS # BLD AUTO: 0.04 K/UL — SIGNIFICANT CHANGE UP (ref 0–0.2)
BASOPHILS NFR BLD AUTO: 0.3 % — SIGNIFICANT CHANGE UP (ref 0–2)
BILIRUB SERPL-MCNC: 1.4 MG/DL — HIGH (ref 0.2–1.2)
BUN SERPL-MCNC: 14 MG/DL — SIGNIFICANT CHANGE UP (ref 7–23)
CALCIUM SERPL-MCNC: 8.9 MG/DL — SIGNIFICANT CHANGE UP (ref 8.4–10.5)
CHLORIDE SERPL-SCNC: 98 MMOL/L — SIGNIFICANT CHANGE UP (ref 98–107)
CO2 SERPL-SCNC: 23 MMOL/L — SIGNIFICANT CHANGE UP (ref 22–31)
CREAT SERPL-MCNC: 0.83 MG/DL — SIGNIFICANT CHANGE UP (ref 0.5–1.3)
EOSINOPHIL # BLD AUTO: 0.02 K/UL — SIGNIFICANT CHANGE UP (ref 0–0.5)
EOSINOPHIL NFR BLD AUTO: 0.1 % — SIGNIFICANT CHANGE UP (ref 0–6)
GLUCOSE SERPL-MCNC: 94 MG/DL — SIGNIFICANT CHANGE UP (ref 70–99)
HCT VFR BLD CALC: 41.5 % — SIGNIFICANT CHANGE UP (ref 39–50)
HGB BLD-MCNC: 14.4 G/DL — SIGNIFICANT CHANGE UP (ref 13–17)
IMM GRANULOCYTES # BLD AUTO: 0.1 # — SIGNIFICANT CHANGE UP
IMM GRANULOCYTES NFR BLD AUTO: 0.7 % — SIGNIFICANT CHANGE UP (ref 0–1.5)
LYMPHOCYTES # BLD AUTO: 0.66 K/UL — LOW (ref 1–3.3)
LYMPHOCYTES # BLD AUTO: 4.7 % — LOW (ref 13–44)
MCHC RBC-ENTMCNC: 30.5 PG — SIGNIFICANT CHANGE UP (ref 27–34)
MCHC RBC-ENTMCNC: 34.7 % — SIGNIFICANT CHANGE UP (ref 32–36)
MCV RBC AUTO: 87.9 FL — SIGNIFICANT CHANGE UP (ref 80–100)
MONOCYTES # BLD AUTO: 1.04 K/UL — HIGH (ref 0–0.9)
MONOCYTES NFR BLD AUTO: 7.3 % — SIGNIFICANT CHANGE UP (ref 2–14)
NEUTROPHILS # BLD AUTO: 12.32 K/UL — HIGH (ref 1.8–7.4)
NEUTROPHILS NFR BLD AUTO: 86.9 % — HIGH (ref 43–77)
NRBC # FLD: 0 — SIGNIFICANT CHANGE UP
PLATELET # BLD AUTO: 217 K/UL — SIGNIFICANT CHANGE UP (ref 150–400)
PMV BLD: 11.4 FL — SIGNIFICANT CHANGE UP (ref 7–13)
POTASSIUM SERPL-MCNC: 3.8 MMOL/L — SIGNIFICANT CHANGE UP (ref 3.5–5.3)
POTASSIUM SERPL-SCNC: 3.8 MMOL/L — SIGNIFICANT CHANGE UP (ref 3.5–5.3)
PROT SERPL-MCNC: 7.2 G/DL — SIGNIFICANT CHANGE UP (ref 6–8.3)
RBC # BLD: 4.72 M/UL — SIGNIFICANT CHANGE UP (ref 4.2–5.8)
RBC # FLD: 12.3 % — SIGNIFICANT CHANGE UP (ref 10.3–14.5)
SODIUM SERPL-SCNC: 138 MMOL/L — SIGNIFICANT CHANGE UP (ref 135–145)
WBC # BLD: 14.18 K/UL — HIGH (ref 3.8–10.5)
WBC # FLD AUTO: 14.18 K/UL — HIGH (ref 3.8–10.5)

## 2018-10-21 PROCEDURE — 99233 SBSQ HOSP IP/OBS HIGH 50: CPT

## 2018-10-21 PROCEDURE — 76705 ECHO EXAM OF ABDOMEN: CPT | Mod: 26

## 2018-10-21 RX ORDER — POLYETHYLENE GLYCOL 3350 17 G/17G
17 POWDER, FOR SOLUTION ORAL DAILY
Qty: 0 | Refills: 0 | Status: DISCONTINUED | OUTPATIENT
Start: 2018-10-21 | End: 2018-10-28

## 2018-10-21 RX ORDER — SENNA PLUS 8.6 MG/1
2 TABLET ORAL AT BEDTIME
Qty: 0 | Refills: 0 | Status: DISCONTINUED | OUTPATIENT
Start: 2018-10-21 | End: 2018-10-28

## 2018-10-21 RX ORDER — DOCUSATE SODIUM 100 MG
100 CAPSULE ORAL THREE TIMES A DAY
Qty: 0 | Refills: 0 | Status: DISCONTINUED | OUTPATIENT
Start: 2018-10-21 | End: 2018-10-28

## 2018-10-21 RX ADMIN — PIPERACILLIN AND TAZOBACTAM 25 GRAM(S): 4; .5 INJECTION, POWDER, LYOPHILIZED, FOR SOLUTION INTRAVENOUS at 22:28

## 2018-10-21 RX ADMIN — SENNA PLUS 2 TABLET(S): 8.6 TABLET ORAL at 22:27

## 2018-10-21 RX ADMIN — PIPERACILLIN AND TAZOBACTAM 25 GRAM(S): 4; .5 INJECTION, POWDER, LYOPHILIZED, FOR SOLUTION INTRAVENOUS at 05:12

## 2018-10-21 RX ADMIN — Medication 100 MILLIGRAM(S): at 22:27

## 2018-10-21 RX ADMIN — Medication 1 DROP(S): at 13:28

## 2018-10-21 RX ADMIN — POLYETHYLENE GLYCOL 3350 17 GRAM(S): 17 POWDER, FOR SOLUTION ORAL at 22:27

## 2018-10-21 RX ADMIN — CARBIDOPA AND LEVODOPA 1 TABLET(S): 25; 100 TABLET ORAL at 13:27

## 2018-10-21 RX ADMIN — PIPERACILLIN AND TAZOBACTAM 25 GRAM(S): 4; .5 INJECTION, POWDER, LYOPHILIZED, FOR SOLUTION INTRAVENOUS at 13:28

## 2018-10-21 RX ADMIN — HEPARIN SODIUM 5000 UNIT(S): 5000 INJECTION INTRAVENOUS; SUBCUTANEOUS at 05:12

## 2018-10-21 RX ADMIN — HEPARIN SODIUM 5000 UNIT(S): 5000 INJECTION INTRAVENOUS; SUBCUTANEOUS at 22:29

## 2018-10-21 NOTE — PROGRESS NOTE ADULT - PROBLEM SELECTOR PLAN 3
WBC of 17 with left shift.   improving wbc 15 today, will continue to monitor WBC of 17 with left shift.   improving wbc 14 today, will continue to monitor

## 2018-10-21 NOTE — DISCHARGE NOTE ADULT - HOSPITAL COURSE
78 M PMHx Parkinson's Disease, NPH being admitted for recurrent mechanical falls and encephalopathy.     Fall.    -CTH negative for acute change  -Fall/aspiration precautions  -PT/OT - rehab   -Neuro Cx - recommend MRI head w/ and w/o contrast   -TSH WNL, CRP elevated, B12/folate elevated  -MRI brain unchanged     Encephalopathy.   -As per wife, pt more confused, similar to fall, unclear if confusion is related to chronic PD vs infectious etiology.     Leukocytosis.    -WBC 17   -ESR WNL, CRP elevated   -S/P Vanco, Zosyn     Parkinson's disease.    -Sinemet    Normal pressure hydrocephalus.    -CTH shows stable NPH      Stridor  -ENT Cx - no acute intervention recommendations  -OFMS Cx - REFUSED, no intervention can be done inpatient or at clinic, recommend to follow up outpatient clinic

## 2018-10-21 NOTE — DISCHARGE NOTE ADULT - SECONDARY DIAGNOSIS.
Dysphagia, unspecified type Failure to thrive in adult Normal pressure hydrocephalus Parkinson's disease Stridor

## 2018-10-21 NOTE — PROGRESS NOTE ADULT - PROBLEM SELECTOR PLAN 5
As per CT Head, stable NPH. Unclear if the cause for falls yet. Follow up Neurology recommendations As per CT Head, stable NPH. Unclear if the cause for falls yet. Follow up Neurology recommendations.  MRI of brain- No  S/H/M. NPH unchanged

## 2018-10-21 NOTE — PROGRESS NOTE ADULT - ATTENDING COMMENTS
c/w Zosyn for FUO  blood cult- neg  urine cult- neg  RVP- neg  Observe c/w Zosyn for FUO ? Asp PNA  blood cult- neg  urine cult- neg  RVP- neg  Observe c/w Zosyn for FUO ? Asp PNA  blood cult- neg  urine cult- neg  RVP- neg  Observe  Patient seen with room full of people- family- wife is present.  patient states he gets low back pain- non now- wife noted he had low back bruising prior to admission.  Wife asks for result of MRI .- results given to wife- NPH

## 2018-10-21 NOTE — DISCHARGE NOTE ADULT - PATIENT PORTAL LINK FT
negative You can access the [facility], offered by [facility], by registering with the following website: [email or web address]

## 2018-10-21 NOTE — DISCHARGE NOTE ADULT - CARE PLAN
Principal Discharge DX:	Encephalopathy  Secondary Diagnosis:	Dysphagia, unspecified type  Secondary Diagnosis:	Failure to thrive in adult  Secondary Diagnosis:	Normal pressure hydrocephalus  Secondary Diagnosis:	Parkinson's disease  Secondary Diagnosis:	Stridor

## 2018-10-21 NOTE — PROGRESS NOTE ADULT - ASSESSMENT
78M hx of Parkinson's Disease, Normal Pressure Hydrocephalus being admitted for recurrent mechanical falls and encephalopathy 78M hx of Parkinson's Disease, Normal Pressure Hydrocephalus being admitted for recurrent mechanical falls and encephalopathy   ? Asp PNA- c/w Iv Zosyn

## 2018-10-21 NOTE — PROGRESS NOTE ADULT - SUBJECTIVE AND OBJECTIVE BOX
Patient is a 78y old  Male who presents with a chief complaint of Repeat Mechanical Falls (21 Oct 2018 10:43)      SUBJECTIVE / OVERNIGHT EVENTS:    MEDICATIONS  (STANDING):  artificial tears (preservative free) Ophthalmic Solution 1 Drop(s) Both EYES daily  carbidopa/levodopa  25/100 1 Tablet(s) Oral daily  heparin  Injectable 5000 Unit(s) SubCutaneous every 8 hours  influenza   Vaccine 0.5 milliLiter(s) IntraMuscular once  piperacillin/tazobactam IVPB. 3.375 Gram(s) IV Intermittent every 8 hours    MEDICATIONS  (PRN):  acetaminophen   Tablet .. 650 milliGRAM(s) Oral every 6 hours PRN Temp greater or equal to 38C (100.4F)        PHYSICAL EXAM:  Vital Signs Last 24 Hrs  T(C): 36.8 (21 Oct 2018 14:41), Max: 37.8 (21 Oct 2018 05:11)  T(F): 98.2 (21 Oct 2018 14:41), Max: 100 (21 Oct 2018 05:11)  HR: 72 (21 Oct 2018 14:41) (72 - 91)  BP: 103/53 (21 Oct 2018 14:41) (103/53 - 139/93)  BP(mean): --  RR: 18 (21 Oct 2018 14:41) (18 - 18)  SpO2: 96% (21 Oct 2018 14:41) (94% - 96%)  GENERAL: NAD, well-developed  HEAD:  Atraumatic, Normocephalic  EYES: EOMI, PERRLA, conjunctiva and sclera clear  NECK: Supple, No JVD  CHEST/LUNG: Clear to auscultation bilaterally; No wheeze  HEART: Regular rate and rhythm; No murmurs, rubs, or gallops  ABDOMEN: Soft, Nontender, Nondistended; Bowel sounds present  EXTREMITIES:  2+ Peripheral Pulses, No clubbing, cyanosis, or edema  PSYCH: AAOx3  NEUROLOGY: non-focal  SKIN: No rashes or lesions    LABS:                        14.4   14.18 )-----------( 217      ( 21 Oct 2018 06:15 )             41.5     10-21    138  |  98  |  14  ----------------------------<  94  3.8   |  23  |  0.83    Ca    8.9      21 Oct 2018 06:15    TPro  7.2  /  Alb  3.5  /  TBili  1.4<H>  /  DBili  x   /  AST  33  /  ALT  30  /  AlkPhos  166<H>  10-21        RADIOLOGY & ADDITIONAL TESTS:    Imaging Personally Reviewed:    Consultant(s) Notes Reviewed:      Care Discussed with Consultants/Other Providers: Patient is a 78y old  Male who presents with a chief complaint of Repeat Mechanical Falls (21 Oct 2018 10:43)      SUBJECTIVE / OVERNIGHT EVENTS:  Patient seen with room full of people- family- wife is present.  patient states he gets low back pain- non now- wife noted he had low back bruising prior to admission.  Wife asks for result of MRI .    MEDICATIONS  (STANDING):  artificial tears (preservative free) Ophthalmic Solution 1 Drop(s) Both EYES daily  carbidopa/levodopa  25/100 1 Tablet(s) Oral daily  heparin  Injectable 5000 Unit(s) SubCutaneous every 8 hours  influenza   Vaccine 0.5 milliLiter(s) IntraMuscular once  piperacillin/tazobactam IVPB. 3.375 Gram(s) IV Intermittent every 8 hours    MEDICATIONS  (PRN):  acetaminophen   Tablet .. 650 milliGRAM(s) Oral every 6 hours PRN Temp greater or equal to 38C (100.4F)        PHYSICAL EXAM:  Vital Signs Last 24 Hrs  T(C): 36.8 (21 Oct 2018 14:41), Max: 37.8 (21 Oct 2018 05:11)  T(F): 98.2 (21 Oct 2018 14:41), Max: 100 (21 Oct 2018 05:11)  HR: 72 (21 Oct 2018 14:41) (72 - 91)  BP: 103/53 (21 Oct 2018 14:41) (103/53 - 139/93)  BP(mean): --  RR: 18 (21 Oct 2018 14:41) (18 - 18)  SpO2: 96% (21 Oct 2018 14:41) (94% - 96%)  GENERAL: NAD, well-developed  HEAD:  Atraumatic, Normocephalic  EYES: EOMI, PERRLA, conjunctiva and sclera clear  NECK: Supple, No JVD  CHEST/LUNG: Clear to auscultation bilaterally; No wheeze  HEART: Regular rate and rhythm; No murmurs, rubs, or gallops  ABDOMEN: Soft, Nontender, Nondistended; Bowel sounds present  EXTREMITIES:  2+ Peripheral Pulses, No clubbing, cyanosis, or edema  PSYCH: Alert      LABS:                        14.4   14.18 )-----------( 217      ( 21 Oct 2018 06:15 )             41.5     10-21    138  |  98  |  14  ----------------------------<  94  3.8   |  23  |  0.83    Ca    8.9      21 Oct 2018 06:15    TPro  7.2  /  Alb  3.5  /  TBili  1.4<H>  /  DBili  x   /  AST  33  /  ALT  30  /  AlkPhos  166<H>  10-21        RADIOLOGY & ADDITIONAL TESTS:  < from: MR Head w/wo IV Cont (10.20.18 @ 12:58) >  IMPRESSION:    No acute intracranial hemorrhage, masseffect, vasogenic edema, or   evidence of acute territorial infarct.    No abnormal parenchymal or leptomeningeal enhancement.    Ventricles mild to moderately dilated, similar in size to study from   9/7/2017. This may represent the presence of normal pressure   hydrocephalus. Clinical correlation is recommended.      < end of copied text >      Imaging Personally Reviewed:    Consultant(s) Notes Reviewed:      Care Discussed with Consultants/Other Providers:

## 2018-10-22 LAB
ALBUMIN SERPL ELPH-MCNC: 3.7 G/DL — SIGNIFICANT CHANGE UP (ref 3.3–5)
ALP SERPL-CCNC: 172 U/L — HIGH (ref 40–120)
ALT FLD-CCNC: 27 U/L — SIGNIFICANT CHANGE UP (ref 4–41)
AST SERPL-CCNC: 31 U/L — SIGNIFICANT CHANGE UP (ref 4–40)
BASOPHILS # BLD AUTO: 0.05 K/UL — SIGNIFICANT CHANGE UP (ref 0–0.2)
BASOPHILS NFR BLD AUTO: 0.4 % — SIGNIFICANT CHANGE UP (ref 0–2)
BILIRUB SERPL-MCNC: 1.2 MG/DL — SIGNIFICANT CHANGE UP (ref 0.2–1.2)
BUN SERPL-MCNC: 16 MG/DL — SIGNIFICANT CHANGE UP (ref 7–23)
CALCIUM SERPL-MCNC: 9.3 MG/DL — SIGNIFICANT CHANGE UP (ref 8.4–10.5)
CHLORIDE SERPL-SCNC: 100 MMOL/L — SIGNIFICANT CHANGE UP (ref 98–107)
CO2 SERPL-SCNC: 26 MMOL/L — SIGNIFICANT CHANGE UP (ref 22–31)
CREAT SERPL-MCNC: 0.89 MG/DL — SIGNIFICANT CHANGE UP (ref 0.5–1.3)
EOSINOPHIL # BLD AUTO: 0.09 K/UL — SIGNIFICANT CHANGE UP (ref 0–0.5)
EOSINOPHIL NFR BLD AUTO: 0.8 % — SIGNIFICANT CHANGE UP (ref 0–6)
GLUCOSE SERPL-MCNC: 105 MG/DL — HIGH (ref 70–99)
HCT VFR BLD CALC: 41.5 % — SIGNIFICANT CHANGE UP (ref 39–50)
HGB BLD-MCNC: 14.3 G/DL — SIGNIFICANT CHANGE UP (ref 13–17)
IMM GRANULOCYTES # BLD AUTO: 0.08 # — SIGNIFICANT CHANGE UP
IMM GRANULOCYTES NFR BLD AUTO: 0.7 % — SIGNIFICANT CHANGE UP (ref 0–1.5)
LYMPHOCYTES # BLD AUTO: 0.92 K/UL — LOW (ref 1–3.3)
LYMPHOCYTES # BLD AUTO: 7.9 % — LOW (ref 13–44)
MCHC RBC-ENTMCNC: 29.9 PG — SIGNIFICANT CHANGE UP (ref 27–34)
MCHC RBC-ENTMCNC: 34.5 % — SIGNIFICANT CHANGE UP (ref 32–36)
MCV RBC AUTO: 86.6 FL — SIGNIFICANT CHANGE UP (ref 80–100)
MONOCYTES # BLD AUTO: 0.85 K/UL — SIGNIFICANT CHANGE UP (ref 0–0.9)
MONOCYTES NFR BLD AUTO: 7.3 % — SIGNIFICANT CHANGE UP (ref 2–14)
NEUTROPHILS # BLD AUTO: 9.59 K/UL — HIGH (ref 1.8–7.4)
NEUTROPHILS NFR BLD AUTO: 82.9 % — HIGH (ref 43–77)
NRBC # FLD: 0 — SIGNIFICANT CHANGE UP
PLATELET # BLD AUTO: 244 K/UL — SIGNIFICANT CHANGE UP (ref 150–400)
PMV BLD: 11 FL — SIGNIFICANT CHANGE UP (ref 7–13)
POTASSIUM SERPL-MCNC: 3.6 MMOL/L — SIGNIFICANT CHANGE UP (ref 3.5–5.3)
POTASSIUM SERPL-SCNC: 3.6 MMOL/L — SIGNIFICANT CHANGE UP (ref 3.5–5.3)
PROT SERPL-MCNC: 7.2 G/DL — SIGNIFICANT CHANGE UP (ref 6–8.3)
RBC # BLD: 4.79 M/UL — SIGNIFICANT CHANGE UP (ref 4.2–5.8)
RBC # FLD: 12.4 % — SIGNIFICANT CHANGE UP (ref 10.3–14.5)
SODIUM SERPL-SCNC: 140 MMOL/L — SIGNIFICANT CHANGE UP (ref 135–145)
WBC # BLD: 11.58 K/UL — HIGH (ref 3.8–10.5)
WBC # FLD AUTO: 11.58 K/UL — HIGH (ref 3.8–10.5)

## 2018-10-22 PROCEDURE — 99233 SBSQ HOSP IP/OBS HIGH 50: CPT

## 2018-10-22 RX ADMIN — PIPERACILLIN AND TAZOBACTAM 25 GRAM(S): 4; .5 INJECTION, POWDER, LYOPHILIZED, FOR SOLUTION INTRAVENOUS at 06:21

## 2018-10-22 RX ADMIN — HEPARIN SODIUM 5000 UNIT(S): 5000 INJECTION INTRAVENOUS; SUBCUTANEOUS at 13:47

## 2018-10-22 RX ADMIN — PIPERACILLIN AND TAZOBACTAM 25 GRAM(S): 4; .5 INJECTION, POWDER, LYOPHILIZED, FOR SOLUTION INTRAVENOUS at 13:49

## 2018-10-22 RX ADMIN — PIPERACILLIN AND TAZOBACTAM 25 GRAM(S): 4; .5 INJECTION, POWDER, LYOPHILIZED, FOR SOLUTION INTRAVENOUS at 21:15

## 2018-10-22 RX ADMIN — HEPARIN SODIUM 5000 UNIT(S): 5000 INJECTION INTRAVENOUS; SUBCUTANEOUS at 21:15

## 2018-10-22 RX ADMIN — Medication 100 MILLIGRAM(S): at 13:47

## 2018-10-22 RX ADMIN — HEPARIN SODIUM 5000 UNIT(S): 5000 INJECTION INTRAVENOUS; SUBCUTANEOUS at 06:21

## 2018-10-22 RX ADMIN — SENNA PLUS 2 TABLET(S): 8.6 TABLET ORAL at 21:15

## 2018-10-22 RX ADMIN — Medication 100 MILLIGRAM(S): at 06:21

## 2018-10-22 RX ADMIN — Medication 100 MILLIGRAM(S): at 21:15

## 2018-10-22 RX ADMIN — Medication 1 DROP(S): at 11:50

## 2018-10-22 RX ADMIN — CARBIDOPA AND LEVODOPA 1 TABLET(S): 25; 100 TABLET ORAL at 11:50

## 2018-10-22 RX ADMIN — POLYETHYLENE GLYCOL 3350 17 GRAM(S): 17 POWDER, FOR SOLUTION ORAL at 11:50

## 2018-10-22 NOTE — SWALLOW BEDSIDE ASSESSMENT ADULT - ORAL PREPARATORY PHASE
Within functional limits increased mastication time reduced bolus formation/manipulation/control/Anterior loss of bolus

## 2018-10-22 NOTE — SWALLOW BEDSIDE ASSESSMENT ADULT - PHARYNGEAL PHASE
Delayed pharyngeal swallow/Decreased laryngeal elevation/no overt s/s of penetration/aspiration Delayed pharyngeal swallow/Throat clear post oral intake/Decreased laryngeal elevation Wet vocal quality post oral intake/Delayed pharyngeal swallow/Decreased laryngeal elevation

## 2018-10-22 NOTE — SWALLOW BEDSIDE ASSESSMENT ADULT - SLP PERTINENT HISTORY OF CURRENT PROBLEM
78 M PMHx Parkinson's Disease, NPH being admitted for recurrent mechanical falls and encephalopathy.

## 2018-10-22 NOTE — SWALLOW BEDSIDE ASSESSMENT ADULT - SWALLOW EVAL: RECOMMENDED FEEDING/EATING TECHNIQUES
maintain upright posture during/after eating for 30 mins/small sips/bites/alternate food with liquid

## 2018-10-22 NOTE — PROGRESS NOTE ADULT - PROBLEM SELECTOR PLAN 5
As per CT Head, stable NPH. Unclear if the cause for falls yet. Follow up Neurology recommendations.  MRI of brain- No  S/H/M. NPH unchanged As per CT Head, stable NPH. Unclear if the cause for falls yet. Follow up Neurology recommendations.  MRI of brain- No  S/H/M. NPH unchanged  f/u neuro regarding LP

## 2018-10-22 NOTE — PROGRESS NOTE ADULT - ATTENDING COMMENTS
c/w Zosyn for FUO ? Asp PNA  blood cult- neg  urine cult- neg  RVP- neg  Observe  Patient seen with room full of people- family- wife is present.  patient states he gets low back pain- non now- wife noted he had low back bruising prior to admission.  Wife asks for result of MRI .- results given to wife- NPH d/w wife at bedside extensively

## 2018-10-22 NOTE — SWALLOW BEDSIDE ASSESSMENT ADULT - SWALLOW EVAL: PROGNOSIS
pending MBS results IMPRESSIONS CONTINUED:  Patient presented with mild pharyngeal stage dysphagia for puree, mechanical soft solids, and honey thickened liquids marked by delayed swallow trigger, mildly reduced hyolaryngeal elevation upon digital palpation and no overt s/s of penetration/aspiration noted.  It should be noted, the stridor heard at rest prior to oral trials was NOT heard during oral trials.  Breathing was calm and unlabored during oral trials.

## 2018-10-22 NOTE — SWALLOW BEDSIDE ASSESSMENT ADULT - ASR SWALLOW ASPIRATION MONITOR
fever/change of breathing pattern/cough/gurgly voice/pneumonia/throat clearing/upper respiratory infection

## 2018-10-22 NOTE — SWALLOW BEDSIDE ASSESSMENT ADULT - ADDITIONAL RECOMMENDATIONS
1. MD to reconsult this department if changes in medical status should change.   2. This department to continue to follow as schedule permits.

## 2018-10-22 NOTE — SWALLOW BEDSIDE ASSESSMENT ADULT - SWALLOW EVAL: DIAGNOSIS
Patient presented with functional oral stage dysphagia for puree, mechanical soft solids, solids, and honey thickened liquids marked by adequate bolus formation/bolus manipulation, bolus transfer and clearance.  Patient presented with moderate oral stage dysphagia for nectar thickened liquids and thin liquids marked by reduced bolus formation/control, and increased uncontrolled anterior-posterior movement of bolus, resulting in suspected premature spillage over base of tongue prior to swallow trigger.  Patient presented with moderate pharyngeal stage dysphagia for nectar thickened liquids and thin liquids marked by delayed swallow trigger, mildly reduced hyolaryngeal elevation upon digital palpation, and overt s/s of penetration/aspiration in the form of multiple swallows which can be indicative of pharyngeal stasis or penetration.

## 2018-10-22 NOTE — SWALLOW BEDSIDE ASSESSMENT ADULT - COMMENTS
Attempted to see patient this afternoon for an initial assessment of swallow function, however, patient currently off the unit at MRI. This department to re-attempt as schedule permits.
Patient seen upright in bed, stridor noted at baseline upon clinician entering room however stridor was stopped upon an oral motor examination when patient was asked to move his tongue to different positions.  Patient with low vocal volume.  Patient seen by ENT - As per ZINA Roblero, scope was normal.  Patient was referred to OMFS however patient refused at this time.

## 2018-10-22 NOTE — PROGRESS NOTE ADULT - PROBLEM SELECTOR PLAN 1
s/p 3 falls, unclear etiology. multifactorial   - Fall and Aspiration Precautions  - blood cx, UA, urine cx unremarkable   -RVP negative  - patient febrile with leucocytosis, suspecting aspiration PNA, CT chest recommended, pt and wife refusing, c/w empiric treatment for aspiration PNA as leucocytosis and fever improving   RUQ US unremarkable   PT/OT  MRI brain noted, no acute change   await neurology for LP   speech and swallow eval, rec cineesophagogram

## 2018-10-22 NOTE — PROGRESS NOTE ADULT - SUBJECTIVE AND OBJECTIVE BOX
Patient is a 78y old  Male who presents with a chief complaint of Repeat Mechanical Falls (21 Oct 2018 15:12)      SUBJECTIVE / OVERNIGHT EVENTS:  Pt has "snoring" sound even he is awake, sounds congested, s/p suctioning. Pt denied sob/cp.     MEDICATIONS  (STANDING):  artificial tears (preservative free) Ophthalmic Solution 1 Drop(s) Both EYES daily  carbidopa/levodopa  25/100 1 Tablet(s) Oral daily  docusate sodium 100 milliGRAM(s) Oral three times a day  heparin  Injectable 5000 Unit(s) SubCutaneous every 8 hours  influenza   Vaccine 0.5 milliLiter(s) IntraMuscular once  piperacillin/tazobactam IVPB. 3.375 Gram(s) IV Intermittent every 8 hours  polyethylene glycol 3350 17 Gram(s) Oral daily  senna 2 Tablet(s) Oral at bedtime    MEDICATIONS  (PRN):  acetaminophen   Tablet .. 650 milliGRAM(s) Oral every 6 hours PRN Temp greater or equal to 38C (100.4F)      T(C): 36.8 (10-22-18 @ 15:18), Max: 36.8 (10-22-18 @ 15:18)  HR: 84 (10-22-18 @ 15:18) (74 - 84)  BP: 124/73 (10-22-18 @ 15:18) (124/73 - 136/76)  RR: 19 (10-22-18 @ 15:18) (18 - 20)  SpO2: 99% (10-22-18 @ 15:18) (95% - 99%)  CAPILLARY BLOOD GLUCOSE        I&O's Summary      PHYSICAL EXAM:  GENERAL: NAD, thin built   HEAD:  Atraumatic, Normocephalic  EYES: EOMI, PERRLA, conjunctiva and sclera clear  NECK: Supple, No JVD  CHEST/LUNG: Clear to auscultation bilaterally; No wheeze  HEART: s1 s2, regular rhythm and rate   ABDOMEN: Soft, Nontender, Nondistended; Bowel sounds present  EXTREMITIES:  2+ Peripheral Pulses, No clubbing, cyanosis, or edema  PSYCH: awake alert, calm, flat affect   NEUROLOGY: non-focal  SKIN: No rashes or lesions    LABS:                        14.3   11.58 )-----------( 244      ( 22 Oct 2018 07:10 )             41.5     10-22    140  |  100  |  16  ----------------------------<  105<H>  3.6   |  26  |  0.89    Ca    9.3      22 Oct 2018 07:10    TPro  7.2  /  Alb  3.7  /  TBili  1.2  /  DBili  x   /  AST  31  /  ALT  27  /  AlkPhos  172<H>  10-22              RADIOLOGY & ADDITIONAL TESTS:    Imaging Personally Reviewed:    Consultant(s) Notes Reviewed:      Care Discussed with Consultants/Other Providers:

## 2018-10-23 DIAGNOSIS — S09.93XA UNSPECIFIED INJURY OF FACE, INITIAL ENCOUNTER: ICD-10-CM

## 2018-10-23 DIAGNOSIS — R13.12 DYSPHAGIA, OROPHARYNGEAL PHASE: ICD-10-CM

## 2018-10-23 LAB
BACTERIA BLD CULT: SIGNIFICANT CHANGE UP
BACTERIA BLD CULT: SIGNIFICANT CHANGE UP

## 2018-10-23 PROCEDURE — 99233 SBSQ HOSP IP/OBS HIGH 50: CPT

## 2018-10-23 PROCEDURE — 74230 X-RAY XM SWLNG FUNCJ C+: CPT | Mod: 26

## 2018-10-23 RX ORDER — SODIUM CHLORIDE 9 MG/ML
1000 INJECTION, SOLUTION INTRAVENOUS
Qty: 0 | Refills: 0 | Status: DISCONTINUED | OUTPATIENT
Start: 2018-10-23 | End: 2018-10-27

## 2018-10-23 RX ADMIN — PIPERACILLIN AND TAZOBACTAM 25 GRAM(S): 4; .5 INJECTION, POWDER, LYOPHILIZED, FOR SOLUTION INTRAVENOUS at 05:11

## 2018-10-23 RX ADMIN — Medication 1 DROP(S): at 12:00

## 2018-10-23 RX ADMIN — Medication 100 MILLIGRAM(S): at 05:11

## 2018-10-23 RX ADMIN — SODIUM CHLORIDE 70 MILLILITER(S): 9 INJECTION, SOLUTION INTRAVENOUS at 17:45

## 2018-10-23 RX ADMIN — PIPERACILLIN AND TAZOBACTAM 25 GRAM(S): 4; .5 INJECTION, POWDER, LYOPHILIZED, FOR SOLUTION INTRAVENOUS at 13:59

## 2018-10-23 RX ADMIN — PIPERACILLIN AND TAZOBACTAM 25 GRAM(S): 4; .5 INJECTION, POWDER, LYOPHILIZED, FOR SOLUTION INTRAVENOUS at 21:30

## 2018-10-23 RX ADMIN — HEPARIN SODIUM 5000 UNIT(S): 5000 INJECTION INTRAVENOUS; SUBCUTANEOUS at 05:11

## 2018-10-23 NOTE — SWALLOW VFSS/MBS ASSESSMENT ADULT - RECOMMENDED CONSISTENCY
1.) NPO with Non Oral Means of Nutrition. Suggest discussion with Patient/Family regarding Plan of Care for Nutritional Goal of Care (Oral vs Non Oral) with an understanding of the risk/benefits.  2.) Aspiration Precautions  3.) Maintain Good Oral Hygiene Care

## 2018-10-23 NOTE — PROGRESS NOTE ADULT - PROBLEM SELECTOR PLAN 1
s/p 3 falls, multifactorial   - Fall and Aspiration Precautions  - blood cx, UA, urine cx unremarkable   -RVP negative  - patient febrile with leucocytosis, suspecting aspiration PNA, CT chest recommended, pt and wife refusing, c/w empiric treatment for aspiration PNA as leucocytosis and fever improving   RUQ US unremarkable   PT/OT  MRI brain noted, no acute change   await neurology for LP   speech and swallow eval, rec cineesophagogram Pt found to have oropharyngeal dysphagia. high risk for aspiration   Overall prognosis is guarded. I had an extensive discussion with pt's wife. She appears overwhelmed and can't decide on if she wants tube feeding  palliative care consulted

## 2018-10-23 NOTE — PROVIDER CONTACT NOTE (OTHER) - SITUATION
Patient's wife requesting to speak with neurology about patient receiving lumbar puncture before discussing possible tube feeding.

## 2018-10-23 NOTE — PROGRESS NOTE ADULT - SUBJECTIVE AND OBJECTIVE BOX
Patient is a 78y old  Male who presents with a chief complaint of Repeat Mechanical Falls (22 Oct 2018 16:01)      SUBJECTIVE / OVERNIGHT EVENTS:    MEDICATIONS  (STANDING):  artificial tears (preservative free) Ophthalmic Solution 1 Drop(s) Both EYES daily  carbidopa/levodopa  25/100 1 Tablet(s) Oral daily  docusate sodium 100 milliGRAM(s) Oral three times a day  heparin  Injectable 5000 Unit(s) SubCutaneous every 8 hours  influenza   Vaccine 0.5 milliLiter(s) IntraMuscular once  piperacillin/tazobactam IVPB. 3.375 Gram(s) IV Intermittent every 8 hours  polyethylene glycol 3350 17 Gram(s) Oral daily  senna 2 Tablet(s) Oral at bedtime    MEDICATIONS  (PRN):  acetaminophen   Tablet .. 650 milliGRAM(s) Oral every 6 hours PRN Temp greater or equal to 38C (100.4F)      T(C): 36.3 (10-23-18 @ 04:30), Max: 36.8 (10-22-18 @ 15:18)  HR: 82 (10-23-18 @ 04:30) (82 - 87)  BP: 140/77 (10-23-18 @ 04:30) (124/73 - 140/77)  RR: 18 (10-23-18 @ 04:30) (18 - 19)  SpO2: 96% (10-23-18 @ 04:30) (96% - 99%)  CAPILLARY BLOOD GLUCOSE        I&O's Summary      PHYSICAL EXAM:  GENERAL: NAD, well-developed  HEAD:  Atraumatic, Normocephalic  EYES: EOMI, PERRLA, conjunctiva and sclera clear  NECK: Supple, No JVD  CHEST/LUNG: Clear to auscultation bilaterally; No wheeze  HEART: s1 s2, regular rhythm and rate   ABDOMEN: Soft, Nontender, Nondistended; Bowel sounds present  EXTREMITIES:  2+ Peripheral Pulses, No clubbing, cyanosis, or edema  PSYCH: AAOx3, calm   NEUROLOGY: non-focal  SKIN: No rashes or lesions    LABS:                        14.3   11.58 )-----------( 244      ( 22 Oct 2018 07:10 )             41.5     10-22    140  |  100  |  16  ----------------------------<  105<H>  3.6   |  26  |  0.89    Ca    9.3      22 Oct 2018 07:10    TPro  7.2  /  Alb  3.7  /  TBili  1.2  /  DBili  x   /  AST  31  /  ALT  27  /  AlkPhos  172<H>  10-22              RADIOLOGY & ADDITIONAL TESTS:    Imaging Personally Reviewed:    Consultant(s) Notes Reviewed:      Care Discussed with Consultants/Other Providers: Patient is a 78y old  Male who presents with a chief complaint of Repeat Mechanical Falls (22 Oct 2018 16:01)      SUBJECTIVE / OVERNIGHT EVENTS:  Pt is weak, frail. eyes closed most of time. + gurgling sound in the throat, non-labored in breathing. voice is low    MEDICATIONS  (STANDING):  artificial tears (preservative free) Ophthalmic Solution 1 Drop(s) Both EYES daily  carbidopa/levodopa  25/100 1 Tablet(s) Oral daily  docusate sodium 100 milliGRAM(s) Oral three times a day  heparin  Injectable 5000 Unit(s) SubCutaneous every 8 hours  influenza   Vaccine 0.5 milliLiter(s) IntraMuscular once  piperacillin/tazobactam IVPB. 3.375 Gram(s) IV Intermittent every 8 hours  polyethylene glycol 3350 17 Gram(s) Oral daily  senna 2 Tablet(s) Oral at bedtime    MEDICATIONS  (PRN):  acetaminophen   Tablet .. 650 milliGRAM(s) Oral every 6 hours PRN Temp greater or equal to 38C (100.4F)      T(C): 36.3 (10-23-18 @ 04:30), Max: 36.8 (10-22-18 @ 15:18)  HR: 82 (10-23-18 @ 04:30) (82 - 87)  BP: 140/77 (10-23-18 @ 04:30) (124/73 - 140/77)  RR: 18 (10-23-18 @ 04:30) (18 - 19)  SpO2: 96% (10-23-18 @ 04:30) (96% - 99%)  CAPILLARY BLOOD GLUCOSE        I&O's Summary      PHYSICAL EXAM:  GENERAL: NAD, cachectic   HEAD:  Atraumatic, Normocephalic  EYES: conjunctiva and sclera clear  NECK: Supple, No JVD  CHEST/LUNG: transition sound from throat, + gurgling sound   HEART: s1 s2, regular rhythm and rate   ABDOMEN: Soft, Nontender, Nondistended; Bowel sounds present  EXTREMITIES:  2+ Peripheral Pulses, No clubbing, cyanosis, or edema  PSYCH: calm, somnolent   NEUROLOGY: non-focal  SKIN: No rashes or lesions    LABS:                        14.3   11.58 )-----------( 244      ( 22 Oct 2018 07:10 )             41.5     10-22    140  |  100  |  16  ----------------------------<  105<H>  3.6   |  26  |  0.89    Ca    9.3      22 Oct 2018 07:10    TPro  7.2  /  Alb  3.7  /  TBili  1.2  /  DBili  x   /  AST  31  /  ALT  27  /  AlkPhos  172<H>  10-22              RADIOLOGY & ADDITIONAL TESTS:    Imaging Personally Reviewed:    Consultant(s) Notes Reviewed:      Care Discussed with Consultants/Other Providers:

## 2018-10-23 NOTE — PROGRESS NOTE ADULT - PROBLEM SELECTOR PLAN 2
chronic PD +/- infectious etiology.  C/w zosyn for now s/p 3 falls, multifactorial   - Fall and Aspiration Precautions  - blood cx, UA, urine cx unremarkable   -RVP negative  - patient febrile with leucocytosis, suspecting aspiration PNA, CT chest recommended, pt and wife refusing, c/w empiric treatment for aspiration PNA as leucocytosis and fever improving   RUQ US unremarkable   PT/OT  MRI brain noted, no acute change

## 2018-10-23 NOTE — PROGRESS NOTE ADULT - NSHPATTENDINGPLANDISCUSS_GEN_ALL_CORE
patient wife and neurology resident as above and I agree with plan and will continue to follow with you.
ADS PA
ADS PA

## 2018-10-23 NOTE — SWALLOW VFSS/MBS ASSESSMENT ADULT - DIAGNOSTIC IMPRESSIONS
Patient presents with Mild Oral Stage and Moderate to Severe Pharyngeal Stage Dysphagia. The Oral Stage is characterized by adequate oral containment, slow bolus manipulation, slow/delayed tongue motion with slow anterior to posterior transfer of the bolus; piecemeal deglutition with adequate oral clearance post swallow.   The Pharyngeal Stage is characterized by delayed initiation of the pharyngeal swallow (Bolus head is at the pyriforms for Nectar Thick Liquids), reduced laryngeal elevation with incomplete laryngeal vestibular closure, reduced/poor tongue base retraction resulting in moderate to severe vallecular residue post primary swallow and reduced pharyngeal constriction resulting in mild/moderate pyriforms/pharyngeal wall residue post swallow. Of Note: On the first PO trial of Puree, patient had a spontaneous cough response during the PO intake during the swallow which resulted in penetration into the nasopharynx with retrieval to complete the swallow.  There is moderate to severe pharyngeal clearance deficits located diffused in the hypopharynx.   There was Deep Laryngeal Penetration during the swallow for Honey Thick Liquids/Nectar Thick Liquids without retrieval leaving residue in the laryngeal vestibule to the vocal folds; and after the swallow from pharyngeal residue entering/spilling over into the laryngeal vestibule without retrieval.  Patient is not adequately sensate given no reflexive cough response. Patient is verbally cued to cough but is unable to produce a strong volitional cough.  Compensatory strategy of Chin Down was attempted; however, Patient was unable to placed Chin Down to Chest effectively. Spontaneous reswallow does not benefit to assist with pharyngeal clearance. Liquid Wash benefits to assist with partial pharyngeal clearance however results in Laryngeal Penetration with pharyngeal residue entering into the laryngeal vestibule. Patient presents with Mild Oral Stage and Moderate to Severe Pharyngeal Stage Dysphagia. The Oral Stage is characterized by adequate oral containment, slow bolus manipulation, slow/delayed tongue motion with slow anterior to posterior transfer of the bolus; piecemeal deglutition with adequate oral clearance post swallow.   The Pharyngeal Stage is characterized by delayed initiation of the pharyngeal swallow (Bolus head is at the pyriforms for Nectar Thick Liquids), reduced laryngeal elevation with incomplete laryngeal vestibular closure, reduced/poor tongue base retraction resulting in moderate to severe vallecular residue post primary swallow and reduced pharyngeal constriction resulting in mild/moderate pyriforms/pharyngeal wall residue post swallow.  There is moderate to severe pharyngeal clearance deficits located diffused in the hypopharynx.   There was Deep Laryngeal Penetration during the swallow for Honey Thick Liquids/Nectar Thick Liquids without retrieval leaving residue in the laryngeal vestibule to the vocal folds; and after the swallow from pharyngeal residue entering/spilling over into the laryngeal vestibule without retrieval.  Patient is not adequately sensate given no reflexive cough response. Patient is verbally cued to cough but is unable to produce a strong volitional cough.  Compensatory strategy of Chin Down was attempted; however, Patient was unable to placed Chin Down to Chest effectively. Spontaneous reswallow does not benefit to assist with pharyngeal clearance. Liquid Wash benefits to assist with partial pharyngeal clearance however results in Laryngeal Penetration with pharyngeal residue entering into the laryngeal vestibule. Patient is at heighten risk for Aspiration Pneumonia.   Of Note: On the first PO trial of Puree, patient had a spontaneous cough response with absence of contrast in the airway during the PO intake during the swallow which resulted in penetration into the nasopharynx with retrieval to complete the swallow.

## 2018-10-23 NOTE — PROGRESS NOTE ADULT - PROBLEM SELECTOR PLAN 6
h/o facial trauma   consider OMFS eval As per CT Head, stable NPH. Unclear if the cause for falls yet. Follow up Neurology recommendations.  MRI of brain- No  S/H/M. NPH unchanged  f/u neuro regarding large volume LP

## 2018-10-23 NOTE — PROGRESS NOTE ADULT - SUBJECTIVE AND OBJECTIVE BOX
Neurology Follow up note    Patient is a 78y old  Male who presents with a chief complaint of Repeat Mechanical Falls (23 Oct 2018 08:44)    Subjective:Interval History - No events overnight    Objective:   Vital Signs Last 24 Hrs  T(C): 36.9 (23 Oct 2018 12:23), Max: 36.9 (23 Oct 2018 12:23)  T(F): 98.5 (23 Oct 2018 12:23), Max: 98.5 (23 Oct 2018 12:23)  HR: 74 (23 Oct 2018 12:23) (74 - 87)  BP: 127/69 (23 Oct 2018 12:23) (124/73 - 140/77)  BP(mean): --  RR: 17 (23 Oct 2018 12:23) (17 - 19)  SpO2: 99% (23 Oct 2018 12:23) (96% - 99%)    Neurological Examination:  Mental Status: Patient is alert, awake, oriented doesn't know year or location). Patient is fluent, no dysarthria, no aphasia. Follows commands well and able to answer questions appropriately. Mood and affect normal.    Cranial Nerves: PERRL, EOMI, tongue/uvula midline    Motor Exam: No drift  Right upper extremity: 5/5  Left upper extremity: 5/5  Right lower extremity: 5/5  Left lower extremity: 5/5    Normal bulk/tone    Sensory: Intact to light touch bilaterally. No extinction    Coordination: dysmetria bilaterally (diplopia from prior)    Reflexes: Bilateral 1+ Biceps, Brachial, Patellar, Ankle  Plantar: Equivocal bilaterally    GENERAL: No acute distress  HEENT:  Normocephalic, atraumatic  EXTREMITIES: No edema, clubbing, cyanosis  MUSCULOSKELETAL: Normal range of motion  SKIN: No rashes    Other:  10-22    140  |  100  |  16  ----------------------------<  105<H>  3.6   |  26  |  0.89    Ca    9.3      22 Oct 2018 07:10    TPro  7.2  /  Alb  3.7  /  TBili  1.2  /  DBili  x   /  AST  31  /  ALT  27  /  AlkPhos  172<H>  10-22    LIVER FUNCTIONS - ( 22 Oct 2018 07:10 )  Alb: 3.7 g/dL / Pro: 7.2 g/dL / ALK PHOS: 172 u/L / ALT: 27 u/L / AST: 31 u/L / GGT: x                                 14.3   11.58 )-----------( 244      ( 22 Oct 2018 07:10 )             41.5     MEDICATIONS  (STANDING):  artificial tears (preservative free) Ophthalmic Solution 1 Drop(s) Both EYES daily  carbidopa/levodopa  25/100 1 Tablet(s) Oral daily  docusate sodium 100 milliGRAM(s) Oral three times a day  heparin  Injectable 5000 Unit(s) SubCutaneous every 8 hours  influenza   Vaccine 0.5 milliLiter(s) IntraMuscular once  piperacillin/tazobactam IVPB. 3.375 Gram(s) IV Intermittent every 8 hours  polyethylene glycol 3350 17 Gram(s) Oral daily  senna 2 Tablet(s) Oral at bedtime    MEDICATIONS  (PRN):  acetaminophen   Tablet .. 650 milliGRAM(s) Oral every 6 hours PRN Temp greater or equal to 38C (100.4F)

## 2018-10-23 NOTE — PROGRESS NOTE ADULT - ASSESSMENT
Patient is an 78 year old Male with PMHx of Parkinson's disease for inability to ambulate/falls. Patient presented with increased urinary urgency, unsteadiness when standing out of proportion with baseline Parkinsons deficits, worsening confusion. CT head with enlarged ventricles, in conjunction with clinical findings, concerning for NPH. Confusion also possibly secondary to toxic/metabolic insult(elevated white count) or structural deficit; given clinical condition, additional imaging studies to better evaluate patient's condition warranted at this time.     Reccs:  [] Per Dr Moulton family does want to proceed with high volume tap (with opening pressure) for suspected NPH, obtain > 35 cc of csf.   [] Please request for PT to evaluate how many steps patient can take prior to LP and then after the LP is done  [] rest of management per primary team

## 2018-10-23 NOTE — SWALLOW VFSS/MBS ASSESSMENT ADULT - COMMENTS
78M hx of Parkinson's Disease, Normal Pressure Hydrocephalus being admitted for recurrent mechanical falls and encephalopathy.    Patient was seen for a Clinical Swallow Eval on 10/22/2018 (See Consult).

## 2018-10-23 NOTE — PROGRESS NOTE ADULT - PROBLEM SELECTOR PLAN 5
As per CT Head, stable NPH. Unclear if the cause for falls yet. Follow up Neurology recommendations.  MRI of brain- No  S/H/M. NPH unchanged  f/u neuro regarding LP c/w Carbidopa-Levodopa.

## 2018-10-23 NOTE — SWALLOW VFSS/MBS ASSESSMENT ADULT - ADDITIONAL RECOMMENDATIONS
Patient may benefit swallowing therapy pending discharge plans. (e.g. Rehab Center vs Home Care vs Outpatient at [facility]Swallow [facility] [phone])

## 2018-10-24 DIAGNOSIS — R53.2 FUNCTIONAL QUADRIPLEGIA: ICD-10-CM

## 2018-10-24 DIAGNOSIS — Z71.89 OTHER SPECIFIED COUNSELING: ICD-10-CM

## 2018-10-24 LAB
APTT BLD: 27.6 SEC — SIGNIFICANT CHANGE UP (ref 27.5–37.4)
BACTERIA BLD CULT: SIGNIFICANT CHANGE UP
BACTERIA BLD CULT: SIGNIFICANT CHANGE UP
BUN SERPL-MCNC: 11 MG/DL — SIGNIFICANT CHANGE UP (ref 7–23)
CALCIUM SERPL-MCNC: 9 MG/DL — SIGNIFICANT CHANGE UP (ref 8.4–10.5)
CHLORIDE SERPL-SCNC: 101 MMOL/L — SIGNIFICANT CHANGE UP (ref 98–107)
CO2 SERPL-SCNC: 24 MMOL/L — SIGNIFICANT CHANGE UP (ref 22–31)
CREAT SERPL-MCNC: 0.82 MG/DL — SIGNIFICANT CHANGE UP (ref 0.5–1.3)
GLUCOSE SERPL-MCNC: 115 MG/DL — HIGH (ref 70–99)
HCT VFR BLD CALC: 42.7 % — SIGNIFICANT CHANGE UP (ref 39–50)
HGB BLD-MCNC: 14.5 G/DL — SIGNIFICANT CHANGE UP (ref 13–17)
INR BLD: 1.11 — SIGNIFICANT CHANGE UP (ref 0.88–1.17)
MCHC RBC-ENTMCNC: 30 PG — SIGNIFICANT CHANGE UP (ref 27–34)
MCHC RBC-ENTMCNC: 34 % — SIGNIFICANT CHANGE UP (ref 32–36)
MCV RBC AUTO: 88.4 FL — SIGNIFICANT CHANGE UP (ref 80–100)
NRBC # FLD: 0 — SIGNIFICANT CHANGE UP
PLATELET # BLD AUTO: 258 K/UL — SIGNIFICANT CHANGE UP (ref 150–400)
PMV BLD: 10.5 FL — SIGNIFICANT CHANGE UP (ref 7–13)
POTASSIUM SERPL-MCNC: 3.7 MMOL/L — SIGNIFICANT CHANGE UP (ref 3.5–5.3)
POTASSIUM SERPL-SCNC: 3.7 MMOL/L — SIGNIFICANT CHANGE UP (ref 3.5–5.3)
PROTHROM AB SERPL-ACNC: 12.4 SEC — SIGNIFICANT CHANGE UP (ref 9.8–13.1)
RBC # BLD: 4.83 M/UL — SIGNIFICANT CHANGE UP (ref 4.2–5.8)
RBC # FLD: 12.2 % — SIGNIFICANT CHANGE UP (ref 10.3–14.5)
SODIUM SERPL-SCNC: 140 MMOL/L — SIGNIFICANT CHANGE UP (ref 135–145)
WBC # BLD: 15.07 K/UL — HIGH (ref 3.8–10.5)
WBC # FLD AUTO: 15.07 K/UL — HIGH (ref 3.8–10.5)

## 2018-10-24 PROCEDURE — 99233 SBSQ HOSP IP/OBS HIGH 50: CPT

## 2018-10-24 PROCEDURE — 99223 1ST HOSP IP/OBS HIGH 75: CPT

## 2018-10-24 PROCEDURE — 99497 ADVNCD CARE PLAN 30 MIN: CPT | Mod: 25

## 2018-10-24 RX ORDER — SODIUM CHLORIDE 9 MG/ML
4 INJECTION INTRAMUSCULAR; INTRAVENOUS; SUBCUTANEOUS THREE TIMES A DAY
Qty: 0 | Refills: 0 | Status: DISCONTINUED | OUTPATIENT
Start: 2018-10-24 | End: 2018-10-28

## 2018-10-24 RX ORDER — ACETAMINOPHEN 500 MG
1000 TABLET ORAL ONCE
Qty: 0 | Refills: 0 | Status: COMPLETED | OUTPATIENT
Start: 2018-10-24 | End: 2018-10-24

## 2018-10-24 RX ADMIN — PIPERACILLIN AND TAZOBACTAM 25 GRAM(S): 4; .5 INJECTION, POWDER, LYOPHILIZED, FOR SOLUTION INTRAVENOUS at 15:08

## 2018-10-24 RX ADMIN — SODIUM CHLORIDE 4 MILLILITER(S): 9 INJECTION INTRAMUSCULAR; INTRAVENOUS; SUBCUTANEOUS at 22:24

## 2018-10-24 RX ADMIN — Medication 400 MILLIGRAM(S): at 23:27

## 2018-10-24 RX ADMIN — SODIUM CHLORIDE 70 MILLILITER(S): 9 INJECTION, SOLUTION INTRAVENOUS at 15:08

## 2018-10-24 RX ADMIN — PIPERACILLIN AND TAZOBACTAM 25 GRAM(S): 4; .5 INJECTION, POWDER, LYOPHILIZED, FOR SOLUTION INTRAVENOUS at 06:03

## 2018-10-24 RX ADMIN — PIPERACILLIN AND TAZOBACTAM 25 GRAM(S): 4; .5 INJECTION, POWDER, LYOPHILIZED, FOR SOLUTION INTRAVENOUS at 22:31

## 2018-10-24 RX ADMIN — Medication 1 DROP(S): at 11:44

## 2018-10-24 RX ADMIN — SODIUM CHLORIDE 4 MILLILITER(S): 9 INJECTION INTRAMUSCULAR; INTRAVENOUS; SUBCUTANEOUS at 14:46

## 2018-10-24 NOTE — CONSULT NOTE ADULT - PROBLEM SELECTOR RECOMMENDATION 9
- Patient with progressive Parkinson's disease.   - Worsening over several yeas duration.  - Unclear whether a combination of NPH.   - Patient is no longer able to take his Sinemet.  - Wife is not interested in pursuing artifical nutrition.

## 2018-10-24 NOTE — CONSULT NOTE ADULT - PROBLEM SELECTOR RECOMMENDATION 2
- Patient is no longer able to take PO.  - Unable to tolerate Sinemet.  - As per wife, her and her  have had conversations about artifical nutrition in the past, and he did not want NGT or PEG, under any circumstance.

## 2018-10-24 NOTE — CONSULT NOTE ADULT - PROBLEM SELECTOR RECOMMENDATION 4
30 minutes spent with patients wife at bedside.   - Discussed current medical condition, poor overall prognosis as well as functional decline.  - Given that they have had conversations about this in the past, decisions was made to pursue DNR and comfort measures.  - Hospice to evaluate patient in the AM.

## 2018-10-24 NOTE — PROGRESS NOTE ADULT - PROBLEM SELECTOR PLAN 2
s/p 3 falls, multifactorial   - Fall and Aspiration Precautions  - blood cx, UA, urine cx unremarkable   -RVP negative  - patient febrile with leucocytosis, suspecting aspiration PNA, CT chest recommended, pt and wife refusing, c/w empiric treatment for aspiration PNA as leucocytosis and fever improving   RUQ US unremarkable   PT/OT  MRI brain noted, no acute change

## 2018-10-24 NOTE — PROGRESS NOTE ADULT - SUBJECTIVE AND OBJECTIVE BOX
Patient is a 78y old  Male who presents with a chief complaint of Repeat Mechanical Falls (23 Oct 2018 13:16)      SUBJECTIVE / OVERNIGHT EVENTS:  Pt was seen and examined in AM. Pt is somnolent, gurgling sound in throat. Pt can barely stand up with PT.     MEDICATIONS  (STANDING):  artificial tears (preservative free) Ophthalmic Solution 1 Drop(s) Both EYES daily  carbidopa/levodopa  25/100 1 Tablet(s) Oral daily  dextrose 5% + sodium chloride 0.45%. 1000 milliLiter(s) (70 mL/Hr) IV Continuous <Continuous>  docusate sodium 100 milliGRAM(s) Oral three times a day  influenza   Vaccine 0.5 milliLiter(s) IntraMuscular once  piperacillin/tazobactam IVPB. 3.375 Gram(s) IV Intermittent every 8 hours  polyethylene glycol 3350 17 Gram(s) Oral daily  senna 2 Tablet(s) Oral at bedtime  sodium chloride 3%  Inhalation 4 milliLiter(s) Inhalation three times a day    MEDICATIONS  (PRN):  acetaminophen   Tablet .. 650 milliGRAM(s) Oral every 6 hours PRN Temp greater or equal to 38C (100.4F)      T(C): 36.8 (10-24-18 @ 15:54), Max: 37.3 (10-23-18 @ 21:31)  HR: 81 (10-24-18 @ 15:54) (80 - 90)  BP: 139/84 (10-24-18 @ 15:54) (139/81 - 141/94)  RR: 18 (10-24-18 @ 15:54) (18 - 18)  SpO2: 95% (10-24-18 @ 15:54) (95% - 98%)  CAPILLARY BLOOD GLUCOSE        I&O's Summary      PHYSICAL EXAM:  GENERAL: NAD, cachectic   HEAD:  Atraumatic, Normocephalic  EYES: conjunctiva and sclera clear  NECK: Supple, No JVD  CHEST/LUNG: transition sound from throat   HEART: s1 s2, regular rhythm and rate   ABDOMEN: Soft, Nontender, Nondistended; Bowel sounds present  EXTREMITIES:  2+ Peripheral Pulses, No clubbing, cyanosis, or edema  PSYCH: somnolent calm   NEUROLOGY: non-focal  SKIN: No rashes or lesions    LABS:                        14.5   15.07 )-----------( 258      ( 24 Oct 2018 12:30 )             42.7     10-24    140  |  101  |  11  ----------------------------<  115<H>  3.7   |  24  |  0.82    Ca    9.0      24 Oct 2018 12:30      PT/INR - ( 24 Oct 2018 12:30 )   PT: 12.4 SEC;   INR: 1.11          PTT - ( 24 Oct 2018 12:30 )  PTT:27.6 SEC          RADIOLOGY & ADDITIONAL TESTS:    Imaging Personally Reviewed:    Consultant(s) Notes Reviewed:      Care Discussed with Consultants/Other Providers: Dr. Mckay regarding GOC

## 2018-10-24 NOTE — PROGRESS NOTE ADULT - PROBLEM SELECTOR PLAN 1
Pt found to have oropharyngeal dysphagia. high risk for aspiration   Overall prognosis is guarded. I had an extensive discussion with pt's wife again today. She refused tube feeding (NGT or PEG) or artificial nutrition  palliative care team met the wife. Wife is interested in hospice

## 2018-10-24 NOTE — PROGRESS NOTE ADULT - ATTENDING COMMENTS
extensive discussion with wife. She understood that overall prognosis is very poor. emotional support provided

## 2018-10-24 NOTE — CONSULT NOTE ADULT - SUBJECTIVE AND OBJECTIVE BOX
HPI:  78M hx of Parkinson's Disease, Normal Pressure Hydrocephalus presents to [facility] ED s/p 3 straight days of 3 mechanical falls as well as worsening confusion. Most of hx is taken by wife at bedside, as patient has difficulty with speaking 2/2 neurologic condition. As per wife, he started to clinically do poorly about 2-3 weeks ago when he began behaving abnormally. The patient would have worsening ability to speak, and would act confused. For example, he would confuse the dining room for the bathroom. The patient also has been eating less, and she notices that he has been coughing more lately. In the midst of patient's confusion over the last 3 days the patient has had worsening mobility resulting in 3 falls. On his fall today the wife felt he was "warm." She called EMS for assistance and they had brought him to the ED.    In the ED patient was given 1 liter NS bolus. (18 Oct 2018 17:13)    PERTINENT PM/SXH:   Parkinson disease  Kidney stone    History of appendectomy  Abdominal adhesions    FAMILY HISTORY:  No pertinent family history in first degree relatives    ITEMS NOT CHECKED ARE NOT PRESENT    SOCIAL HISTORY:   Significant other/partner:  [x ]  Children:  [ ]  Amish/Spirituality:  Substance hx:  [ ]   Tobacco hx:  [ ]   Alcohol hx: [ ]   Home Opioid hx:  [ ] I-Stop Reference No:  Living Situation: [x ]Home  [ ]Long term care  [ ]Rehab [ ]Other    ADVANCE DIRECTIVES:    DNR  MOLST  [ ]  Living Will  [ ]   DECISION MAKER(s):  [ ] Health Care Proxy(s)  [x ] Surrogate(s)  [ ] Guardian           Name(s): Phone Number(s): Ankita Bowmans (spouse)    BASELINE (I)ADL(s) (prior to admission):  Harrison: [ ]Total  [x ] Moderate [ ]Dependent    Allergies    penicillin (Unknown)    Intolerances    MEDICATIONS  (STANDING):  acetaminophen  IVPB .. 1000 milliGRAM(s) IV Intermittent once  artificial tears (preservative free) Ophthalmic Solution 1 Drop(s) Both EYES daily  carbidopa/levodopa  25/100 1 Tablet(s) Oral daily  dextrose 5% + sodium chloride 0.45%. 1000 milliLiter(s) (70 mL/Hr) IV Continuous <Continuous>  docusate sodium 100 milliGRAM(s) Oral three times a day  influenza   Vaccine 0.5 milliLiter(s) IntraMuscular once  piperacillin/tazobactam IVPB. 3.375 Gram(s) IV Intermittent every 8 hours  polyethylene glycol 3350 17 Gram(s) Oral daily  senna 2 Tablet(s) Oral at bedtime  sodium chloride 3%  Inhalation 4 milliLiter(s) Inhalation three times a day    MEDICATIONS  (PRN):  acetaminophen   Tablet .. 650 milliGRAM(s) Oral every 6 hours PRN Temp greater or equal to 38C (100.4F)    PRESENT SYMPTOMS: [x ]Unable to obtain due to poor mentation   Source if other than patient:  [ ]Family   [ x]Team     Pain (Impact on QOL):  None  Location -         Minimal acceptable level (0-10 scale):                    Aggravating factors -  Quality -  Radiation -  Severity (0-10 scale) -    Timing -    PAIN AD Score:     [email or web address] (press ctrl +  left click to view)    Dyspnea:                           [ ]Mild [ ]Moderate [ ]Severe  Anxiety:                             [ ]Mild [ ]Moderate [ ]Severe  Fatigue:                             [ ]Mild [ ]Moderate [ ]Severe  Nausea:                             [ ]Mild [ ]Moderate [ ]Severe  Loss of appetite:              [ ]Mild [ ]Moderate [ ]Severe  Constipation:                    [ ]Mild [ ]Moderate [ ]Severe    Other Symptoms:  [x]All other review of systems negative     Karnofsky Performance Score/Palliative Performance Status Version 2:    30     %    http://palliative.info/resource_material/[email or web address].pdf  PHYSICAL EXAM:  Vital Signs Last 24 Hrs  T(C): 39.3 (24 Oct 2018 22:20), Max: 39.3 (24 Oct 2018 22:20)  T(F): 102.8 (24 Oct 2018 22:20), Max: 102.8 (24 Oct 2018 22:20)  HR: 76 (24 Oct 2018 22:24) (76 - 90)  BP: 132/87 (24 Oct 2018 21:57) (132/87 - 152/93)  BP(mean): --  RR: 18 (24 Oct 2018 21:57) (18 - 20)  SpO2: 96% (24 Oct 2018 22:24) (95% - 98%) I&O's Summary  GENERAL:  [ ]Alert  [ ]Oriented x   [ x]Lethargic  [ ]Cachexia  [ ]Unarousable  [ ]Verbal  [ ]Non-Verbal  Behavioral:   [ ] Anxiety  [ ] Delirium [ ] Agitation [x ] Other  HEENT:  [ ]Normal   [ x]Dry mouth   [ ]ET Tube/Trach  [ ]Oral lesions  PULMONARY:   [x ]Clear [ ]Tachypnea  [ ]Audible excessive secretions   [ ]Rhonchi        [ ]Right [ ]Left [ ]Bilateral  [ ]Crackles        [ ]Right [ ]Left [ ]Bilateral  [ ]Wheezing     [ ]Right [ ]Left [ ]Bilateral  CARDIOVASCULAR:    [x ]Regular [ ]Irregular [ ]Tachy  [ ]Daryl [ ]Murmur [ ]Other  GASTROINTESTINAL:  [x ]Soft  [ ]Distended   [x ]+BS  [x ]Non tender [ ]Tender  [ ]PEG [ ]OGT/ NGT  Last BM: GENITOURINARY:  [ ]Normal [x ] Incontinent   [ ]Oliguria/Anuria   [ ]Raines  MUSCULOSKELETAL:   [ ]Normal   [ ]Weakness  [x ]Bed/Wheelchair bound [ ]Edema  NEUROLOGIC:   [ ]No focal deficits  [x ] Cognitive impairment  [ ] Dysphagia [ ]Dysarthria [ ] Paresis [ ]Other   SKIN:   [x ]Normal   [ ]Pressure ulcer(s)  [ ]Rash    CRITICAL CARE:  [ ] Shock Present  [ ]Septic [ ]Cardiogenic [ ]Neurologic [ ]Hypovolemic  [ ]  Vasopressors [ ]  Inotropes   [ ] Respiratory failure present  [ ] Acute  [ ] Chronic [ ] Hypoxic  [ ] Hypercarbic [ ] Other  [ ] Other organ failure     LABS:                        14.5   15.07 )-----------( 258      ( 24 Oct 2018 12:30 )             42.7   10-24    140  |  101  |  11  ----------------------------<  115<H>  3.7   |  24  |  0.82    Ca    9.0      24 Oct 2018 12:30    PT/INR - ( 24 Oct 2018 12:30 )   PT: 12.4 SEC;   INR: 1.11          PTT - ( 24 Oct 2018 12:30 )  PTT:27.6 SEC      RADIOLOGY & ADDITIONAL STUDIES:    PROTEIN CALORIE MALNUTRITION PRESENT: [ ] Yes [ ] No  [ ] PPSV2 < or = to 30% [ ] significant weight loss  [ ] poor nutritional intake [ ] catabolic state [ ] anasarca     Albumin, Serum: 3.7 g/dL (10-22-18 @ 07:10)  Artificial Nutrition [ ]     REFERRALS:   [ ]Chaplaincy  [ ] Hospice  [ ]Child Life  [ ]Social Work  [ ]Case management [ ]Holistic Therapy   Goals of Care Discussion Document:

## 2018-10-24 NOTE — PROGRESS NOTE ADULT - PROBLEM SELECTOR PLAN 6
As per CT Head, stable NPH. Unclear if the cause for falls yet. Follow up Neurology recommendations.  MRI of brain- No  S/H/M. NPH unchanged  f/u neuro regarding large volume LP. Benefit is doubted since pt is very weak in general at this time

## 2018-10-25 DIAGNOSIS — A41.9 SEPSIS, UNSPECIFIED ORGANISM: ICD-10-CM

## 2018-10-25 LAB
BUN SERPL-MCNC: 9 MG/DL — SIGNIFICANT CHANGE UP (ref 7–23)
CALCIUM SERPL-MCNC: 9.4 MG/DL — SIGNIFICANT CHANGE UP (ref 8.4–10.5)
CHLORIDE SERPL-SCNC: 99 MMOL/L — SIGNIFICANT CHANGE UP (ref 98–107)
CO2 SERPL-SCNC: 25 MMOL/L — SIGNIFICANT CHANGE UP (ref 22–31)
CREAT SERPL-MCNC: 0.83 MG/DL — SIGNIFICANT CHANGE UP (ref 0.5–1.3)
GLUCOSE SERPL-MCNC: 111 MG/DL — HIGH (ref 70–99)
HCT VFR BLD CALC: 43.9 % — SIGNIFICANT CHANGE UP (ref 39–50)
HGB BLD-MCNC: 15 G/DL — SIGNIFICANT CHANGE UP (ref 13–17)
MCHC RBC-ENTMCNC: 30.1 PG — SIGNIFICANT CHANGE UP (ref 27–34)
MCHC RBC-ENTMCNC: 34.2 % — SIGNIFICANT CHANGE UP (ref 32–36)
MCV RBC AUTO: 88.2 FL — SIGNIFICANT CHANGE UP (ref 80–100)
NRBC # FLD: 0 — SIGNIFICANT CHANGE UP
PLATELET # BLD AUTO: 209 K/UL — SIGNIFICANT CHANGE UP (ref 150–400)
PMV BLD: 11.8 FL — SIGNIFICANT CHANGE UP (ref 7–13)
POTASSIUM SERPL-MCNC: 3.7 MMOL/L — SIGNIFICANT CHANGE UP (ref 3.5–5.3)
POTASSIUM SERPL-SCNC: 3.7 MMOL/L — SIGNIFICANT CHANGE UP (ref 3.5–5.3)
RBC # BLD: 4.98 M/UL — SIGNIFICANT CHANGE UP (ref 4.2–5.8)
RBC # FLD: 12.4 % — SIGNIFICANT CHANGE UP (ref 10.3–14.5)
SODIUM SERPL-SCNC: 139 MMOL/L — SIGNIFICANT CHANGE UP (ref 135–145)
WBC # BLD: 16.26 K/UL — HIGH (ref 3.8–10.5)
WBC # FLD AUTO: 16.26 K/UL — HIGH (ref 3.8–10.5)

## 2018-10-25 PROCEDURE — 71250 CT THORAX DX C-: CPT | Mod: 26

## 2018-10-25 PROCEDURE — 99233 SBSQ HOSP IP/OBS HIGH 50: CPT

## 2018-10-25 RX ORDER — VANCOMYCIN HCL 1 G
750 VIAL (EA) INTRAVENOUS EVERY 12 HOURS
Qty: 0 | Refills: 0 | Status: DISCONTINUED | OUTPATIENT
Start: 2018-10-25 | End: 2018-10-27

## 2018-10-25 RX ORDER — PIPERACILLIN AND TAZOBACTAM 4; .5 G/20ML; G/20ML
3.38 INJECTION, POWDER, LYOPHILIZED, FOR SOLUTION INTRAVENOUS EVERY 8 HOURS
Qty: 0 | Refills: 0 | Status: DISCONTINUED | OUTPATIENT
Start: 2018-10-25 | End: 2018-10-28

## 2018-10-25 RX ADMIN — SODIUM CHLORIDE 70 MILLILITER(S): 9 INJECTION, SOLUTION INTRAVENOUS at 12:04

## 2018-10-25 RX ADMIN — SODIUM CHLORIDE 4 MILLILITER(S): 9 INJECTION INTRAMUSCULAR; INTRAVENOUS; SUBCUTANEOUS at 23:11

## 2018-10-25 RX ADMIN — PIPERACILLIN AND TAZOBACTAM 25 GRAM(S): 4; .5 INJECTION, POWDER, LYOPHILIZED, FOR SOLUTION INTRAVENOUS at 06:41

## 2018-10-25 RX ADMIN — PIPERACILLIN AND TAZOBACTAM 25 GRAM(S): 4; .5 INJECTION, POWDER, LYOPHILIZED, FOR SOLUTION INTRAVENOUS at 21:19

## 2018-10-25 RX ADMIN — Medication 250 MILLIGRAM(S): at 18:09

## 2018-10-25 RX ADMIN — PIPERACILLIN AND TAZOBACTAM 25 GRAM(S): 4; .5 INJECTION, POWDER, LYOPHILIZED, FOR SOLUTION INTRAVENOUS at 13:09

## 2018-10-25 RX ADMIN — Medication 1 DROP(S): at 12:04

## 2018-10-25 NOTE — PROGRESS NOTE ADULT - ATTENDING COMMENTS
extensive discussion with wife today. She understood that overall prognosis is very poor. emotional support provided

## 2018-10-25 NOTE — CONSULT NOTE ADULT - ASSESSMENT
A/P: 79 yo M w/ open locked jaw unable to reduce himself requiring closure.  - Transport to [facility] [place] and treatment  - [place]ano and reduce dislocated jaw  - High chance of re-dislocating A/P: 77 yo M w/ open locked jaw unable to reduce himself requiring closure.  - Reduced dislocated jaw, pt able to close into normal occlusion  - Jaw bra placed, no yawning, no wide mouth opening, soft diet for 2 weeks

## 2018-10-25 NOTE — PROGRESS NOTE ADULT - PROBLEM SELECTOR PLAN 2
Pt found to have oropharyngeal dysphagia. high risk for aspiration   Overall prognosis is guarded. I had an extensive discussion with pt's wife again today. She refused tube feeding (NGT or PEG) or artificial nutrition  palliative care team met the wife. Wife is interested in hospice. called

## 2018-10-25 NOTE — PROGRESS NOTE ADULT - PROBLEM SELECTOR PLAN 6
As per CT Head, stable NPH. Unclear if the cause for falls yet.   MRI of brain- No  S/H/M. NPH unchanged  f/u neuro regarding large volume LP. Benefit is doubted since pt is very weak in general at this time

## 2018-10-25 NOTE — PROGRESS NOTE ADULT - SUBJECTIVE AND OBJECTIVE BOX
Patient is a 78y old  Male who presents with a chief complaint of Repeat Mechanical Falls (25 Oct 2018 11:36)      SUBJECTIVE / OVERNIGHT EVENTS:  Pt was seen multiple times during the day. Pt had dislocated jaw, reduced by OMFS. Noted to have fever and worsening leukocytosis. Pt still somnolent, gurgling sound requiring intermittent suctioning. minimal verbal output     MEDICATIONS  (STANDING):  artificial tears (preservative free) Ophthalmic Solution 1 Drop(s) Both EYES daily  carbidopa/levodopa  25/100 1 Tablet(s) Oral daily  dextrose 5% + sodium chloride 0.45%. 1000 milliLiter(s) (70 mL/Hr) IV Continuous <Continuous>  docusate sodium 100 milliGRAM(s) Oral three times a day  influenza   Vaccine 0.5 milliLiter(s) IntraMuscular once  piperacillin/tazobactam IVPB. 3.375 Gram(s) IV Intermittent every 8 hours  polyethylene glycol 3350 17 Gram(s) Oral daily  senna 2 Tablet(s) Oral at bedtime  sodium chloride 3%  Inhalation 4 milliLiter(s) Inhalation three times a day  vancomycin  IVPB 750 milliGRAM(s) IV Intermittent every 12 hours    MEDICATIONS  (PRN):  acetaminophen   Tablet .. 650 milliGRAM(s) Oral every 6 hours PRN Temp greater or equal to 38C (100.4F)      T(C): 38.1 (10-25-18 @ 13:10), Max: 39.3 (10-24-18 @ 22:20)  HR: 73 (10-25-18 @ 12:05) (73 - 86)  BP: 138/79 (10-25-18 @ 12:05) (132/87 - 152/93)  RR: 19 (10-25-18 @ 12:05) (18 - 20)  SpO2: 99% (10-25-18 @ 12:05) (96% - 99%)  CAPILLARY BLOOD GLUCOSE        I&O's Summary      PHYSICAL EXAM:  GENERAL: NAD, cachectic   HEAD:  Atraumatic, Normocephalic  EYES: conjunctiva and sclera clear  NECK: Supple, No JVD  CHEST/LUNG: gurgling sound, transition sound from throat. non labored in breathing   HEART: s1 s2, regular rhythm and rate   ABDOMEN: Soft, Nontender, Nondistended; Bowel sounds present  EXTREMITIES:  2+ Peripheral Pulses, No clubbing, cyanosis, or edema  PSYCH: somonlent, calm   NEUROLOGY: non-focal  SKIN: No rashes or lesions    LABS:                        15.0   16.26 )-----------( 209      ( 25 Oct 2018 06:24 )             43.9     10-25    139  |  99  |  9   ----------------------------<  111<H>  3.7   |  25  |  0.83    Ca    9.4      25 Oct 2018 06:24      PT/INR - ( 24 Oct 2018 12:30 )   PT: 12.4 SEC;   INR: 1.11          PTT - ( 24 Oct 2018 12:30 )  PTT:27.6 SEC          RADIOLOGY & ADDITIONAL TESTS:    Imaging Personally Reviewed:    Consultant(s) Notes Reviewed:      Care Discussed with Consultants/Other Providers:

## 2018-10-25 NOTE — PROVIDER CONTACT NOTE (OTHER) - SITUATION
pt jaw not in position, was biting down while being suctioned this morning and now cannot close mouth-unaligned, wife states that this has happened in the past- tried her usual method to correct

## 2018-10-25 NOTE — PROGRESS NOTE ADULT - PROBLEM SELECTOR PLAN 1
Pt has sepsis with fever and leukocytosis. suspecting aspiration PNA. Pt leukocytosis improved on Zosyn, but worsening now   Add vanco for MRSA coverage   Wife agrees with CT chest.   f/u blood cx

## 2018-10-25 NOTE — PROGRESS NOTE ADULT - PROBLEM SELECTOR PLAN 3
s/p 3 falls, multifactorial   Pt is becoming weaker in general.   MRI brain at admission noted, no acute change

## 2018-10-25 NOTE — CONSULT NOTE ADULT - SUBJECTIVE AND OBJECTIVE BOX
HPI: 78y Male admitted for poor functional status and inability to tolerate PO w/ PMH Parkinson's disease and Normal Pressure Hydrocephalus currently has a dislocated open lock jaw. Pt's ability to communicate is limited, his wife reports repeated hx of jaw dislocation and ability to relocate on his own. This time pt is unable to relocate and has been locked open since roughly 7am this morning.    PMH: Parkinson's disease, Normal Pressure Hydrocephalus  PSH: Abdominal adhesions, hx of appendectomy  Meds: Cabidopa-levodopa, CoQ10, Vit D3, Vit B, Vit, E  SH: denies  All: NKDA    Vital Signs Last 24 Hrs  T(C): 37.3 (25 Oct 2018 06:38), Max: 39.3 (24 Oct 2018 22:20)  T(F): 99.1 (25 Oct 2018 06:38), Max: 102.8 (24 Oct 2018 22:20)  HR: 73 (25 Oct 2018 06:38) (73 - 86)  BP: 137/73 (25 Oct 2018 06:38) (132/87 - 152/93)  BP(mean): --  RR: 20 (25 Oct 2018 06:38) (18 - 20)  SpO2: 96% (25 Oct 2018 06:38) (95% - 98%)    Gen: NAD, AAOx3  CV: RRR, no murmurs  Pulm: CTA b/l, no wheezes  H: NCAT, normocephalic, no edema  E: PERRL, EOMI, no proptosis, no obvious signs of corneal abrasion, no diplopia, no changes in vision  E: TMs clear b/l, no audio changes  N: nares clear b/l, no septal hematoma  T: oropharyngeal clear, trachea midline  EOE: no LAD, no extraoral edema, no signs of lacerations  TMJ: FROM, no clicking/popping b/l  MORALES: 42-44mm  IOE: generalized good OH, no dental trauma, occlusion stable and reproducible, gingiva clean, no signs of lacerations intraorally                          15.0   16.26 )-----------( 209      ( 25 Oct 2018 06:24 )             43.9     10-25    139  |  99  |  9   ----------------------------<  111<H>  3.7   |  25  |  0.83    Ca    9.4      25 Oct 2018 06:24      I&O's Summary        Radiographic findings: (CT Max/Face without contrast)    A/P: 78y Male s/p __.  - HPI: 78y Male admitted for poor functional status and inability to tolerate PO w/ PMH Parkinson's disease and Normal Pressure Hydrocephalus currently has a dislocated open lock jaw. Failed swallow study yesterday, NPO currently. Pt's ability to communicate is limited, his wife reports repeated hx of jaw dislocation and ability to relocate on his own. This time pt is unable to relocate and has been locked open since roughly 7am this morning.    PMH: Parkinson's disease, Normal Pressure Hydrocephalus  PSH: Abdominal adhesions, hx of appendectomy  Meds: Cabidopa-levodopa, CoQ10, Vit D3, Vit B, Vit, E  SH: denies  All: NKDA    Vital Signs Last 24 Hrs  T(C): 37.3 (25 Oct 2018 06:38), Max: 39.3 (24 Oct 2018 22:20)  T(F): 99.1 (25 Oct 2018 06:38), Max: 102.8 (24 Oct 2018 22:20)  HR: 73 (25 Oct 2018 06:38) (73 - 86)  BP: 137/73 (25 Oct 2018 06:38) (132/87 - 152/93)  BP(mean): --  RR: 20 (25 Oct 2018 06:38) (18 - 20)  SpO2: 96% (25 Oct 2018 06:38) (95% - 98%)    Physical Exam  EOE: No LAD, mandible locked open at ~25mm, unable to close into occlusion, mandible deviated to the left  IOE: generalized poor OH, heavy mucous secretions                          15.0   16.26 )-----------( 209      ( 25 Oct 2018 06:24 )             43.9     10-25    139  |  99  |  9   ----------------------------<  111<H>  3.7   |  25  |  0.83    Ca    9.4      25 Oct 2018 06:24 HPI: 78y Male admitted for poor functional status and inability to tolerate PO w/ PMH Parkinson's disease and Normal Pressure Hydrocephalus currently has a dislocated open lock jaw. Failed swallow study yesterday, NPO currently. Pt's ability to communicate is limited, his wife reports repeated hx of jaw dislocation and ability to relocate on his own. This time pt is unable to relocate and has been locked open since roughly 7am this morning.    PMH: Parkinson's disease, Normal Pressure Hydrocephalus  PSH: Abdominal adhesions, hx of appendectomy  Meds: Cabidopa-levodopa, CoQ10, Vit D3, Vit B, Vit, E  SH: Lives at home w/ wife  All: Penicillin    Vital Signs Last 24 Hrs  T(C): 37.3 (25 Oct 2018 06:38), Max: 39.3 (24 Oct 2018 22:20)  T(F): 99.1 (25 Oct 2018 06:38), Max: 102.8 (24 Oct 2018 22:20)  HR: 73 (25 Oct 2018 06:38) (73 - 86)  BP: 137/73 (25 Oct 2018 06:38) (132/87 - 152/93)  BP(mean): --  RR: 20 (25 Oct 2018 06:38) (18 - 20)  SpO2: 96% (25 Oct 2018 06:38) (95% - 98%)    Physical Exam  EOE: No LAD, mandible locked open at ~25mm, unable to close into occlusion, mandible deviated to the left  IOE: generalized poor OH, heavy mucous secretions                          15.0   16.26 )-----------( 209      ( 25 Oct 2018 06:24 )             43.9     10-25    139  |  99  |  9   ----------------------------<  111<H>  3.7   |  25  |  0.83    Ca    9.4      25 Oct 2018 06:24

## 2018-10-26 LAB
BUN SERPL-MCNC: 9 MG/DL — SIGNIFICANT CHANGE UP (ref 7–23)
CALCIUM SERPL-MCNC: 8.9 MG/DL — SIGNIFICANT CHANGE UP (ref 8.4–10.5)
CHLORIDE SERPL-SCNC: 101 MMOL/L — SIGNIFICANT CHANGE UP (ref 98–107)
CO2 SERPL-SCNC: 26 MMOL/L — SIGNIFICANT CHANGE UP (ref 22–31)
CREAT SERPL-MCNC: 0.75 MG/DL — SIGNIFICANT CHANGE UP (ref 0.5–1.3)
GLUCOSE SERPL-MCNC: 134 MG/DL — HIGH (ref 70–99)
HCT VFR BLD CALC: 39.6 % — SIGNIFICANT CHANGE UP (ref 39–50)
HGB BLD-MCNC: 13.8 G/DL — SIGNIFICANT CHANGE UP (ref 13–17)
MAGNESIUM SERPL-MCNC: 2 MG/DL — SIGNIFICANT CHANGE UP (ref 1.6–2.6)
MCHC RBC-ENTMCNC: 30.1 PG — SIGNIFICANT CHANGE UP (ref 27–34)
MCHC RBC-ENTMCNC: 34.8 % — SIGNIFICANT CHANGE UP (ref 32–36)
MCV RBC AUTO: 86.3 FL — SIGNIFICANT CHANGE UP (ref 80–100)
NRBC # FLD: 0 — SIGNIFICANT CHANGE UP
PHOSPHATE SERPL-MCNC: 3 MG/DL — SIGNIFICANT CHANGE UP (ref 2.5–4.5)
PLATELET # BLD AUTO: 257 K/UL — SIGNIFICANT CHANGE UP (ref 150–400)
PMV BLD: 10.7 FL — SIGNIFICANT CHANGE UP (ref 7–13)
POTASSIUM SERPL-MCNC: 3.3 MMOL/L — LOW (ref 3.5–5.3)
POTASSIUM SERPL-SCNC: 3.3 MMOL/L — LOW (ref 3.5–5.3)
RBC # BLD: 4.59 M/UL — SIGNIFICANT CHANGE UP (ref 4.2–5.8)
RBC # FLD: 12.4 % — SIGNIFICANT CHANGE UP (ref 10.3–14.5)
SODIUM SERPL-SCNC: 140 MMOL/L — SIGNIFICANT CHANGE UP (ref 135–145)
SPECIMEN SOURCE: SIGNIFICANT CHANGE UP
SPECIMEN SOURCE: SIGNIFICANT CHANGE UP
WBC # BLD: 14.56 K/UL — HIGH (ref 3.8–10.5)
WBC # FLD AUTO: 14.56 K/UL — HIGH (ref 3.8–10.5)

## 2018-10-26 PROCEDURE — 99233 SBSQ HOSP IP/OBS HIGH 50: CPT

## 2018-10-26 RX ORDER — ROBINUL 0.2 MG/ML
0.4 INJECTION INTRAMUSCULAR; INTRAVENOUS EVERY 6 HOURS
Qty: 0 | Refills: 0 | Status: DISCONTINUED | OUTPATIENT
Start: 2018-10-26 | End: 2018-10-26

## 2018-10-26 RX ORDER — ROBINUL 0.2 MG/ML
4 INJECTION INTRAMUSCULAR; INTRAVENOUS EVERY 6 HOURS
Qty: 0 | Refills: 0 | Status: DISCONTINUED | OUTPATIENT
Start: 2018-10-26 | End: 2018-10-26

## 2018-10-26 RX ORDER — POTASSIUM CHLORIDE 20 MEQ
10 PACKET (EA) ORAL
Qty: 0 | Refills: 0 | Status: COMPLETED | OUTPATIENT
Start: 2018-10-26 | End: 2018-10-26

## 2018-10-26 RX ADMIN — SODIUM CHLORIDE 4 MILLILITER(S): 9 INJECTION INTRAMUSCULAR; INTRAVENOUS; SUBCUTANEOUS at 22:06

## 2018-10-26 RX ADMIN — Medication 250 MILLIGRAM(S): at 05:38

## 2018-10-26 RX ADMIN — PIPERACILLIN AND TAZOBACTAM 25 GRAM(S): 4; .5 INJECTION, POWDER, LYOPHILIZED, FOR SOLUTION INTRAVENOUS at 21:52

## 2018-10-26 RX ADMIN — SODIUM CHLORIDE 4 MILLILITER(S): 9 INJECTION INTRAMUSCULAR; INTRAVENOUS; SUBCUTANEOUS at 15:38

## 2018-10-26 RX ADMIN — Medication 100 MILLIEQUIVALENT(S): at 10:35

## 2018-10-26 RX ADMIN — PIPERACILLIN AND TAZOBACTAM 25 GRAM(S): 4; .5 INJECTION, POWDER, LYOPHILIZED, FOR SOLUTION INTRAVENOUS at 13:24

## 2018-10-26 RX ADMIN — Medication 1 DROP(S): at 13:24

## 2018-10-26 RX ADMIN — SODIUM CHLORIDE 4 MILLILITER(S): 9 INJECTION INTRAMUSCULAR; INTRAVENOUS; SUBCUTANEOUS at 07:51

## 2018-10-26 RX ADMIN — PIPERACILLIN AND TAZOBACTAM 25 GRAM(S): 4; .5 INJECTION, POWDER, LYOPHILIZED, FOR SOLUTION INTRAVENOUS at 05:37

## 2018-10-26 RX ADMIN — Medication 100 MILLIEQUIVALENT(S): at 13:23

## 2018-10-26 RX ADMIN — Medication 250 MILLIGRAM(S): at 17:29

## 2018-10-26 NOTE — PROGRESS NOTE ADULT - SUBJECTIVE AND OBJECTIVE BOX
Patient is a 78y old  Male who presents with a chief complaint of Repeat Mechanical Falls (25 Oct 2018 17:01)      SUBJECTIVE / OVERNIGHT EVENTS:  Pt is somnolent, wearing the jaw bra, mouth open, snoring sound.     MEDICATIONS  (STANDING):  artificial tears (preservative free) Ophthalmic Solution 1 Drop(s) Both EYES daily  carbidopa/levodopa  25/100 1 Tablet(s) Oral daily  dextrose 5% + sodium chloride 0.45%. 1000 milliLiter(s) (70 mL/Hr) IV Continuous <Continuous>  docusate sodium 100 milliGRAM(s) Oral three times a day  influenza   Vaccine 0.5 milliLiter(s) IntraMuscular once  piperacillin/tazobactam IVPB. 3.375 Gram(s) IV Intermittent every 8 hours  polyethylene glycol 3350 17 Gram(s) Oral daily  senna 2 Tablet(s) Oral at bedtime  sodium chloride 3%  Inhalation 4 milliLiter(s) Inhalation three times a day  vancomycin  IVPB 750 milliGRAM(s) IV Intermittent every 12 hours    MEDICATIONS  (PRN):  acetaminophen   Tablet .. 650 milliGRAM(s) Oral every 6 hours PRN Temp greater or equal to 38C (100.4F)      T(C): 37.1 (10-26-18 @ 14:59), Max: 37.5 (10-25-18 @ 21:30)  HR: 80 (10-26-18 @ 15:38) (68 - 86)  BP: 134/74 (10-26-18 @ 14:59) (128/77 - 149/73)  RR: 18 (10-26-18 @ 14:59) (17 - 18)  SpO2: 96% (10-26-18 @ 14:59) (96% - 99%)  CAPILLARY BLOOD GLUCOSE        I&O's Summary      PHYSICAL EXAM:  GENERAL: NAD, cachectic   HEAD:  Atraumatic, Normocephalic  NECK: No JVD  CHEST/LUNG: gurgling sound, transition sound from throat. non labored in breathing   HEART: s1 s2, regular rhythm and rate   ABDOMEN: Soft, Nontender, Nondistended; Bowel sounds present  EXTREMITIES:  2+ Peripheral Pulses, No clubbing, cyanosis, or edema  PSYCH: somnolent, calm   NEUROLOGY: non-focal  SKIN: No rashes or lesions  LABS:                        13.8   14.56 )-----------( 257      ( 26 Oct 2018 06:10 )             39.6     10-26    140  |  101  |  9   ----------------------------<  134<H>  3.3<L>   |  26  |  0.75    Ca    8.9      26 Oct 2018 06:10  Phos  3.0     10-26  Mg     2.0     10-26                RADIOLOGY & ADDITIONAL TESTS:    Imaging Personally Reviewed: < from: CT Chest No Cont (10.25.18 @ 22:13) >  IMPRESSION:     Bibasilar mucoid impaction and atelectasis with or without superimposed   pneumonia.    6 mm right lower lobe nodule.    6 month follow-up CT recommended for complete evaluation.    < end of copied text >      Consultant(s) Notes Reviewed:      Care Discussed with Consultants/Other Providers:

## 2018-10-26 NOTE — GOALS OF CARE CONVERSATION - PERSONAL ADVANCE DIRECTIVE - CONVERSATION DETAILS
[facility]  - Consents signed. Pt has been approved for an Inpt bed at the [facility]. A bed was not available today.

## 2018-10-26 NOTE — PROGRESS NOTE ADULT - PROBLEM SELECTOR PLAN 2
Pt found to have oropharyngeal dysphagia. high risk for aspiration   Overall prognosis is guarded. Wife refused tube feeding (NGT or PEG) or artificial nutrition. Pleasure feeds  palliative care team met the wife. Wife is interested in hospice.

## 2018-10-26 NOTE — PROGRESS NOTE ADULT - ASSESSMENT
78M hx of Parkinson's Disease, Normal Pressure Hydrocephalus being admitted for recurrent mechanical falls and encephalopathy 78M hx of Parkinson's Disease, Normal Pressure Hydrocephalus being admitted for recurrent mechanical falls and encephalopathy   Wife refused pharmacological DVT ppx

## 2018-10-26 NOTE — PROGRESS NOTE ADULT - PROBLEM SELECTOR PLAN 1
Pt has sepsis with fever and leukocytosis. suspecting aspiration PNA. Pt leukocytosis improved on Zosyn, but worsening now   vanco added for MRSA coverage with improvement in WBC  CT chest 10/25: mucus plug, can't rule out PNA  f/u blood cx NGTD

## 2018-10-27 LAB
BUN SERPL-MCNC: 9 MG/DL — SIGNIFICANT CHANGE UP (ref 7–23)
CALCIUM SERPL-MCNC: 8.9 MG/DL — SIGNIFICANT CHANGE UP (ref 8.4–10.5)
CHLORIDE SERPL-SCNC: 102 MMOL/L — SIGNIFICANT CHANGE UP (ref 98–107)
CO2 SERPL-SCNC: 25 MMOL/L — SIGNIFICANT CHANGE UP (ref 22–31)
CREAT SERPL-MCNC: 0.78 MG/DL — SIGNIFICANT CHANGE UP (ref 0.5–1.3)
GLUCOSE SERPL-MCNC: 139 MG/DL — HIGH (ref 70–99)
HCT VFR BLD CALC: 40.5 % — SIGNIFICANT CHANGE UP (ref 39–50)
HGB BLD-MCNC: 14.4 G/DL — SIGNIFICANT CHANGE UP (ref 13–17)
MAGNESIUM SERPL-MCNC: 2 MG/DL — SIGNIFICANT CHANGE UP (ref 1.6–2.6)
MCHC RBC-ENTMCNC: 30.6 PG — SIGNIFICANT CHANGE UP (ref 27–34)
MCHC RBC-ENTMCNC: 35.6 % — SIGNIFICANT CHANGE UP (ref 32–36)
MCV RBC AUTO: 86.2 FL — SIGNIFICANT CHANGE UP (ref 80–100)
NRBC # FLD: 0 — SIGNIFICANT CHANGE UP
PHOSPHATE SERPL-MCNC: 3 MG/DL — SIGNIFICANT CHANGE UP (ref 2.5–4.5)
PLATELET # BLD AUTO: 283 K/UL — SIGNIFICANT CHANGE UP (ref 150–400)
PMV BLD: 10.6 FL — SIGNIFICANT CHANGE UP (ref 7–13)
POTASSIUM SERPL-MCNC: 3.2 MMOL/L — LOW (ref 3.5–5.3)
POTASSIUM SERPL-SCNC: 3.2 MMOL/L — LOW (ref 3.5–5.3)
RBC # BLD: 4.7 M/UL — SIGNIFICANT CHANGE UP (ref 4.2–5.8)
RBC # FLD: 12.2 % — SIGNIFICANT CHANGE UP (ref 10.3–14.5)
SODIUM SERPL-SCNC: 140 MMOL/L — SIGNIFICANT CHANGE UP (ref 135–145)
VANCOMYCIN TROUGH SERPL-MCNC: 8.2 UG/ML — LOW (ref 10–20)
WBC # BLD: 11.04 K/UL — HIGH (ref 3.8–10.5)
WBC # FLD AUTO: 11.04 K/UL — HIGH (ref 3.8–10.5)

## 2018-10-27 PROCEDURE — 99233 SBSQ HOSP IP/OBS HIGH 50: CPT

## 2018-10-27 RX ORDER — POTASSIUM CHLORIDE 20 MEQ
10 PACKET (EA) ORAL
Qty: 0 | Refills: 0 | Status: COMPLETED | OUTPATIENT
Start: 2018-10-27 | End: 2018-10-27

## 2018-10-27 RX ORDER — SODIUM CHLORIDE 9 MG/ML
1000 INJECTION, SOLUTION INTRAVENOUS
Qty: 0 | Refills: 0 | Status: DISCONTINUED | OUTPATIENT
Start: 2018-10-27 | End: 2018-10-28

## 2018-10-27 RX ORDER — VANCOMYCIN HCL 1 G
1000 VIAL (EA) INTRAVENOUS EVERY 12 HOURS
Qty: 0 | Refills: 0 | Status: DISCONTINUED | OUTPATIENT
Start: 2018-10-27 | End: 2018-10-28

## 2018-10-27 RX ADMIN — Medication 100 MILLIEQUIVALENT(S): at 10:22

## 2018-10-27 RX ADMIN — SODIUM CHLORIDE 4 MILLILITER(S): 9 INJECTION INTRAMUSCULAR; INTRAVENOUS; SUBCUTANEOUS at 21:09

## 2018-10-27 RX ADMIN — PIPERACILLIN AND TAZOBACTAM 25 GRAM(S): 4; .5 INJECTION, POWDER, LYOPHILIZED, FOR SOLUTION INTRAVENOUS at 05:09

## 2018-10-27 RX ADMIN — SODIUM CHLORIDE 4 MILLILITER(S): 9 INJECTION INTRAMUSCULAR; INTRAVENOUS; SUBCUTANEOUS at 15:11

## 2018-10-27 RX ADMIN — Medication 100 MILLIEQUIVALENT(S): at 09:20

## 2018-10-27 RX ADMIN — Medication 250 MILLIGRAM(S): at 05:09

## 2018-10-27 RX ADMIN — Medication 1 DROP(S): at 11:24

## 2018-10-27 RX ADMIN — Medication 250 MILLIGRAM(S): at 18:05

## 2018-10-27 RX ADMIN — PIPERACILLIN AND TAZOBACTAM 25 GRAM(S): 4; .5 INJECTION, POWDER, LYOPHILIZED, FOR SOLUTION INTRAVENOUS at 14:00

## 2018-10-27 RX ADMIN — SODIUM CHLORIDE 4 MILLILITER(S): 9 INJECTION INTRAMUSCULAR; INTRAVENOUS; SUBCUTANEOUS at 09:58

## 2018-10-27 RX ADMIN — Medication 100 MILLIEQUIVALENT(S): at 11:24

## 2018-10-27 RX ADMIN — SODIUM CHLORIDE 75 MILLILITER(S): 9 INJECTION, SOLUTION INTRAVENOUS at 09:40

## 2018-10-27 RX ADMIN — PIPERACILLIN AND TAZOBACTAM 25 GRAM(S): 4; .5 INJECTION, POWDER, LYOPHILIZED, FOR SOLUTION INTRAVENOUS at 21:32

## 2018-10-27 NOTE — PROGRESS NOTE ADULT - ASSESSMENT
78M hx of Parkinson's Disease, Normal Pressure Hydrocephalus being admitted for recurrent mechanical falls and encephalopathy   Wife refused pharmacological DVT ppx [phone]

## 2018-10-27 NOTE — CHART NOTE - NSCHARTNOTEFT_GEN_A_CORE
ADS NIGHT COVERAGE:    VT subtherapeutic at 8.2. Dose increased to 1g BID.     S. Pacheco ADS PA-C  [id number]

## 2018-10-27 NOTE — PROGRESS NOTE ADULT - REASON FOR ADMISSION
Repeat Mechanical Falls

## 2018-10-27 NOTE — PROGRESS NOTE ADULT - SUBJECTIVE AND OBJECTIVE BOX
Patient is a 78y old  Male who presents with a chief complaint of Repeat Mechanical Falls (27 Oct 2018 12:33)      SUBJECTIVE / OVERNIGHT EVENTS:  Pt eyes closed, mouth open, snoring, somnolent. open eyes to verbal stimuli, and answer simple questions. wearing the jaw bra     MEDICATIONS  (STANDING):  artificial tears (preservative free) Ophthalmic Solution 1 Drop(s) Both EYES daily  carbidopa/levodopa  25/100 1 Tablet(s) Oral daily  dextrose 5% + sodium chloride 0.45% 1000 milliLiter(s) (75 mL/Hr) IV Continuous <Continuous>  docusate sodium 100 milliGRAM(s) Oral three times a day  influenza   Vaccine 0.5 milliLiter(s) IntraMuscular once  piperacillin/tazobactam IVPB. 3.375 Gram(s) IV Intermittent every 8 hours  polyethylene glycol 3350 17 Gram(s) Oral daily  senna 2 Tablet(s) Oral at bedtime  sodium chloride 3%  Inhalation 4 milliLiter(s) Inhalation three times a day  vancomycin  IVPB 1000 milliGRAM(s) IV Intermittent every 12 hours    MEDICATIONS  (PRN):  acetaminophen   Tablet .. 650 milliGRAM(s) Oral every 6 hours PRN Temp greater or equal to 38C (100.4F)      T(C): 36.7 (10-27-18 @ 12:40), Max: 37.1 (10-26-18 @ 14:59)  HR: 82 (10-27-18 @ 12:59) (68 - 92)  BP: 150/77 (10-27-18 @ 12:59) (134/74 - 178/92)  RR: 17 (10-27-18 @ 12:59) (17 - 18)  SpO2: 100% (10-27-18 @ 12:59) (96% - 100%)  CAPILLARY BLOOD GLUCOSE        I&O's Summary      PHYSICAL EXAM:  GENERAL: NAD, cachectic   HEAD:  Atraumatic, Normocephalic  NECK: No JVD  CHEST/LUNG: non labored in breathing  HEART: s1 s2, regular rhythm and rate   ABDOMEN: Soft, Nontender, Nondistended; Bowel sounds present  EXTREMITIES:  2+ Peripheral Pulses, No clubbing, cyanosis, or edema  PSYCH: somnolent, calm   NEUROLOGY: non-focal  SKIN: No rashes or lesions    LABS:                        14.4   11.04 )-----------( 283      ( 27 Oct 2018 06:00 )             40.5     10-27    140  |  102  |  9   ----------------------------<  139<H>  3.2<L>   |  25  |  0.78    Ca    8.9      27 Oct 2018 06:00  Phos  3.0     10-27  Mg     2.0     10-27                RADIOLOGY & ADDITIONAL TESTS:    Imaging Personally Reviewed:    Consultant(s) Notes Reviewed:      Care Discussed with Consultants/Other Providers:

## 2018-10-27 NOTE — PROGRESS NOTE ADULT - PROBLEM SELECTOR PROBLEM 7
Facial trauma
Prophylactic measure
Facial trauma

## 2018-10-27 NOTE — PROGRESS NOTE ADULT - PROBLEM SELECTOR PLAN 7
h/o facial trauma   dislocated jaw, happened at home multiple times, often reduced by wife or pt himself.   appreciate OMFS consult   c/w jaw bra
h/o facial trauma   consider OMFS eval if consistent with GOC
IMPROVE VTE Individual Risk Assessment, Score = 2 c/w HSQ for DVT ppx           RISK                                                          Points  [  ] Previous VTE                                               3  [  ] Thrombophilia                                            2  [  ] Lower limb paresis/paralysis                    2    [  ] Active Cancer (in last 6 months)              2   [ x ] Immobilization > 24 hrs                             1  [  ] ICU/CCU stay > 24 hours                            1  [ x ] Age > 60                                                        1                                            Total Score ______2___
h/o facial trauma   dislocated jaw, happened at home multiple times, often reduced by wife or pt himself.   appreciate OMFS consult   c/w jaw bra
h/o facial trauma   dislocated jaw, happened at home multiple times, often reduced by wife or pt himself.   appreciate OMFS consult   c/w jaw bra
h/o facial trauma   consider OMFS eval if consistent with GOC

## 2018-10-27 NOTE — PROGRESS NOTE ADULT - SUBJECTIVE AND OBJECTIVE BOX
· Subjective and Objective: 	  Neurology Follow up note    Patient is a 78y old  Male who presents with a chief complaint of Repeat Mechanical Falls (23 Oct 2018 08:44)    Subjective:Interval History - No events overnight    Objective:   Vital Signs Last 24 Hrs  T(C): 36.8  HR: 80  BP: 120/69  RR: 18    Neurological Examination:  Mental Status: Patient is alert, awake, oriented doesn't know year or location). Patient is fluent, no dysarthria, no aphasia. Follows commands well and able to answer questions appropriately. Mood and affect normal.    Cranial Nerves: PERRL, EOMI, tongue/uvula midline    Motor Exam: No drift  Right upper extremity: 5/5  Left upper extremity: 5/5  Right lower extremity: 5/5  Left lower extremity: 5/5    Normal bulk/tone    Sensory: Intact to light touch bilaterally. No extinction    Coordination: dysmetria bilaterally (diplopia from prior)    Reflexes: Bilateral 1+ Biceps, Brachial, Patellar, Ankle  Plantar: Equivocal bilaterally    GENERAL: No acute distress  HEENT:  Normocephalic, atraumatic  EXTREMITIES: No edema, clubbing, cyanosis  MUSCULOSKELETAL: Normal range of motion  SKIN: No rashes    Other:  10-22    140  |  100  |  16  ----------------------------<  105<H>  3.6   |  26  |  0.89    Ca    9.3      22 Oct 2018 07:10    TPro  7.2  /  Alb  3.7  /  TBili  1.2  /  DBili  x   /  AST  31  /  ALT  27  /  AlkPhos  172<H>  10-22    LIVER FUNCTIONS - ( 22 Oct 2018 07:10 )  Alb: 3.7 g/dL / Pro: 7.2 g/dL / ALK PHOS: 172 u/L / ALT: 27 u/L / AST: 31 u/L / GGT: x                                 14.3   11.58 )-----------( 244      ( 22 Oct 2018 07:10 )             41.5     MEDICATIONS  (STANDING):  artificial tears (preservative free) Ophthalmic Solution 1 Drop(s) Both EYES daily  carbidopa/levodopa  25/100 1 Tablet(s) Oral daily  docusate sodium 100 milliGRAM(s) Oral three times a day  heparin  Injectable 5000 Unit(s) SubCutaneous every 8 hours  influenza   Vaccine 0.5 milliLiter(s) IntraMuscular once  piperacillin/tazobactam IVPB. 3.375 Gram(s) IV Intermittent every 8 hours  polyethylene glycol 3350 17 Gram(s) Oral daily  senna 2 Tablet(s) Oral at bedtime    MEDICATIONS  (PRN):  acetaminophen   Tablet .. 650 milliGRAM(s) Oral every 6 hours PRN Temp greater or equal to 38C (100.4F)               Assessment and Plan:   · Assessment		  Patient is an 78 year old Male with PMHx of Parkinson's disease for inability to ambulate/falls. Patient presented with increased urinary urgency, unsteadiness when standing out of proportion with baseline Parkinsons deficits, worsening confusion. CT head with enlarged ventricles, in conjunction with clinical findings, concerning for NPH. Confusion also possibly secondary to toxic/metabolic insult(elevated white count) or structural deficit; given clinical condition, additional imaging studies to better evaluate patient's condition warranted at this time.   other issues involved      work up on hold

## 2018-10-27 NOTE — PROGRESS NOTE ADULT - PROVIDER SPECIALTY LIST ADULT
Hospitalist
Neurology
Neurology
Hospitalist
Hospitalist

## 2018-10-27 NOTE — PROGRESS NOTE ADULT - PROBLEM SELECTOR PLAN 1
Pt has sepsis with fever and leukocytosis. suspecting aspiration PNA.   vanco added for MRSA coverage with improvement in WBC  CT chest 10/25: mucus plug, can't rule out PNA  f/u blood cx NGTD

## 2018-10-28 VITALS — TEMPERATURE: 100 F

## 2018-10-28 LAB
BASOPHILS # BLD AUTO: 0.13 K/UL — SIGNIFICANT CHANGE UP (ref 0–0.2)
BASOPHILS NFR BLD AUTO: 0.6 % — SIGNIFICANT CHANGE UP (ref 0–2)
BASOPHILS NFR SPEC: 0 % — SIGNIFICANT CHANGE UP (ref 0–2)
BLASTS # FLD: 0 % — SIGNIFICANT CHANGE UP (ref 0–0)
BURR CELLS BLD QL SMEAR: PRESENT — SIGNIFICANT CHANGE UP
EOSINOPHIL # BLD AUTO: 0.15 K/UL — SIGNIFICANT CHANGE UP (ref 0–0.5)
EOSINOPHIL NFR BLD AUTO: 0.7 % — SIGNIFICANT CHANGE UP (ref 0–6)
EOSINOPHIL NFR FLD: 0.9 % — SIGNIFICANT CHANGE UP (ref 0–6)
GIANT PLATELETS BLD QL SMEAR: PRESENT — SIGNIFICANT CHANGE UP
HCT VFR BLD CALC: 44.9 % — SIGNIFICANT CHANGE UP (ref 39–50)
HGB BLD-MCNC: 15.6 G/DL — SIGNIFICANT CHANGE UP (ref 13–17)
IMM GRANULOCYTES # BLD AUTO: 0.41 # — SIGNIFICANT CHANGE UP
IMM GRANULOCYTES NFR BLD AUTO: 1.8 % — HIGH (ref 0–1.5)
LYMPHOCYTES # BLD AUTO: 0.8 K/UL — LOW (ref 1–3.3)
LYMPHOCYTES # BLD AUTO: 3.6 % — LOW (ref 13–44)
LYMPHOCYTES NFR SPEC AUTO: 1.8 % — LOW (ref 13–44)
MCHC RBC-ENTMCNC: 30.4 PG — SIGNIFICANT CHANGE UP (ref 27–34)
MCHC RBC-ENTMCNC: 34.7 % — SIGNIFICANT CHANGE UP (ref 32–36)
MCV RBC AUTO: 87.5 FL — SIGNIFICANT CHANGE UP (ref 80–100)
METAMYELOCYTES # FLD: 0 % — SIGNIFICANT CHANGE UP (ref 0–1)
MONOCYTES # BLD AUTO: 1.32 K/UL — HIGH (ref 0–0.9)
MONOCYTES NFR BLD AUTO: 5.9 % — SIGNIFICANT CHANGE UP (ref 2–14)
MONOCYTES NFR BLD: 1.7 % — LOW (ref 2–9)
MYELOCYTES NFR BLD: 0 % — SIGNIFICANT CHANGE UP (ref 0–0)
NEUTROPHIL AB SER-ACNC: 92.2 % — HIGH (ref 43–77)
NEUTROPHILS # BLD AUTO: 19.63 K/UL — HIGH (ref 1.8–7.4)
NEUTROPHILS NFR BLD AUTO: 87.4 % — HIGH (ref 43–77)
NEUTS BAND # BLD: 1.7 % — SIGNIFICANT CHANGE UP (ref 0–6)
NRBC # FLD: 0 — SIGNIFICANT CHANGE UP
OTHER - HEMATOLOGY %: 0 — SIGNIFICANT CHANGE UP
PLATELET # BLD AUTO: 347 K/UL — SIGNIFICANT CHANGE UP (ref 150–400)
PLATELET COUNT - ESTIMATE: NORMAL — SIGNIFICANT CHANGE UP
PMV BLD: 10.7 FL — SIGNIFICANT CHANGE UP (ref 7–13)
POIKILOCYTOSIS BLD QL AUTO: SLIGHT — SIGNIFICANT CHANGE UP
PROMYELOCYTES # FLD: 0 % — SIGNIFICANT CHANGE UP (ref 0–0)
RBC # BLD: 5.13 M/UL — SIGNIFICANT CHANGE UP (ref 4.2–5.8)
RBC # FLD: 12.4 % — SIGNIFICANT CHANGE UP (ref 10.3–14.5)
VARIANT LYMPHS # BLD: 1.7 % — SIGNIFICANT CHANGE UP
WBC # BLD: 22.44 K/UL — HIGH (ref 3.8–10.5)
WBC # FLD AUTO: 22.44 K/UL — HIGH (ref 3.8–10.5)

## 2018-10-28 PROCEDURE — 99238 HOSP IP/OBS DSCHRG MGMT 30/<: CPT

## 2018-10-28 RX ORDER — ACETAMINOPHEN 500 MG
1000 TABLET ORAL ONCE
Qty: 0 | Refills: 0 | Status: COMPLETED | OUTPATIENT
Start: 2018-10-28 | End: 2018-10-28

## 2018-10-28 RX ORDER — SODIUM CHLORIDE 9 MG/ML
4 INJECTION INTRAMUSCULAR; INTRAVENOUS; SUBCUTANEOUS ONCE
Qty: 0 | Refills: 0 | Status: COMPLETED | OUTPATIENT
Start: 2018-10-28 | End: 2018-10-28

## 2018-10-28 RX ADMIN — SODIUM CHLORIDE 4 MILLILITER(S): 9 INJECTION INTRAMUSCULAR; INTRAVENOUS; SUBCUTANEOUS at 05:50

## 2018-10-28 RX ADMIN — Medication 1000 MILLIGRAM(S): at 07:00

## 2018-10-28 RX ADMIN — Medication 250 MILLIGRAM(S): at 06:15

## 2018-10-28 RX ADMIN — Medication 400 MILLIGRAM(S): at 06:33

## 2018-10-28 RX ADMIN — PIPERACILLIN AND TAZOBACTAM 25 GRAM(S): 4; .5 INJECTION, POWDER, LYOPHILIZED, FOR SOLUTION INTRAVENOUS at 06:15

## 2018-10-28 NOTE — DISCHARGE NOTE FOR THE EXPIRED PATIENT - HOSPITAL COURSE
78M hx of Parkinson's Disease, Normal Pressure Hydrocephalus being admitted for recurrent mechanical falls and encephalopathy   Wife refused pharmacological DVT ppx      Problem/Plan - 1:  ·  Problem: Sepsis.  Plan: Pt has sepsis with fever and leukocytosis. suspecting aspiration PNA.   vanco added for MRSA coverage with improvement in WBC  CT chest 10/25: mucus plug, can't rule out PNA  f/u blood cx NGTD.      Problem/Plan - 2:  ·  Problem: Oropharyngeal dysphagia.  Plan: Pt found to have oropharyngeal dysphagia. high risk for aspiration   Overall prognosis is guarded. Wife refused tube feeding (NGT or PEG) or artificial nutrition. Pleasure feeds  palliative care team met the wife. Wife is interested in hospice.      Problem/Plan - 3:  ·  Problem: Fall.  Plan: s/p 3 falls, multifactorial   Pt is becoming weaker in general.   MRI brain at admission noted, no acute change.      Problem/Plan - 4:  ·  Problem: Encephalopathy.  Plan: metabolic encephalopahty   chronic PD +/- infectious etiology.  C/w vanco / zosyn for now.      Problem/Plan - 5:  ·  Problem: Parkinson's disease.  Plan: c/w Carbidopa-Levodopa as tolerated.      Problem/Plan - 6:  Problem: Normal pressure hydrocephalus. Plan: As per CT Head, stable NPH. Unclear if the cause for falls yet.   MRI of brain- No  S/H/M. NPH unchanged  f/u neuro regarding large volume LP. Benefit is doubted since pt is very weak in general at this time.     Problem/Plan - 7:  ·  Problem: Facial trauma.  Plan: h/o facial trauma   dislocated jaw, happened at home multiple times, often reduced by wife or pt himself.   appreciate OMFS consult   c/w jaw bra.

## 2018-10-28 NOTE — CHART NOTE - NSCHARTNOTEFT_GEN_A_CORE
Pt was seen and examined about 9:50am. Pt has no vitals signs.   emotional support provided to wife who's at bedside.   Time 40 min

## 2018-10-28 NOTE — PROVIDER CONTACT NOTE (OTHER) - ACTION/TREATMENT ORDERED:
will order tylenol, continue to monitor
ADS aware. Will F/U with neurology.
Draw set of blood cultures. Administer Tylenol PRN. Continue to monitor.
NP to assess patient.
Ok to hold colace and senna.
pt given tylenl, no new orders given
will get in contact with specialist

## 2018-10-30 LAB
BACTERIA BLD CULT: SIGNIFICANT CHANGE UP
BACTERIA BLD CULT: SIGNIFICANT CHANGE UP

## 2019-02-11 NOTE — ED ADULT TRIAGE NOTE - CHIEF COMPLAINT QUOTE
Nutrition/Food History


Breakfast:  breakfast sandwich


Lunch:  2 sandwiches


Dinner:  2 bowls chicken and dumpling





Nutritional Education


Nutrition Education Topic:  Diabetic Nutrition


Learning Readiness:  Eager


Teaching Methods:  Discussion, Handout


Response to Teaching:  Verbalize understanding


Teaching Recipient:  Patient, Significant Other


Nutrition Counseling:  


Pt states was dx as T2DM and recently changed to T!DM.  Wife states


pancrease was checked and  " he doesn't make insulin".   Pt has a free


style hiral but not wearing it because he is concerned he "will rip it


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  Provided tegraderm to place over it to


possibly keep it on longer.   BG past 2 days range from .  Pt on


lantis and humalog.  Wife states gives Humalog based on what is BG is


before eating and "guesses" how much to give.  pt aware that CHO reaises


BG and could identify some high TORIN foods but also thougt peanut butter


and oils and meat raises BG.  Reviewed  what are High CHO foods and CHO


counting.  Recommend pt state with a 10:1 CHO ratio for mealtime insulin.


Encouraged pt to use phone vannesa to determine CHO in foods.  Reviewed how


insulin pump operate.  Provided handout on [employer] insulin pump and gave


.  Stated there were also other insulin companeis that could


check out.  Pt given contact information and recommended call if


additional questions or concerns.





Nutrition Monitoring & Eval


RD Patient Assessment Time:  75 minutes


RD Assessment Type:  RD Education


Nutritional Comment:  


Provided 75 minues diabetic education focusing on insulin pumps, action of


insulin and CHO, CHO counting.





Copies To


Copies to:   VESTA NAVARRETE MD ;











ANDREA QUINONEZ                    Feb 11, 2019 17:18 loss balance

## 2019-08-14 NOTE — ED ADULT NURSE REASSESSMENT NOTE - NS ED NURSE REASSESS COMMENT FT1
Pt is resting well he was turned and positioned v/s taken pt is awake and sent to [facility] report was given. Pt was called and was given MD's message. Pt voiced understanding of message. She had no questions or concerns.

## 2020-05-15 NOTE — ED ADULT NURSE NOTE - CADM POA CENTRAL LINE
Post-Care Instructions: Patient instructed to not lie down for 4 hours and limit physical activity for 24 hours. Patient instructed not to travel by airplane for 48 hours. No

## 2021-08-24 NOTE — ED ADULT NURSE NOTE - EXPLANATION OF PATIENT'S REASON FOR LEAVING
Tea with honey can help with sore throat and cough  Try the generic form of Allegra-D or Zyrtec D or Claritin D for sinus pressure and congestion  We will contact you with the coronavirus test results when they are available  Rest and drink plenty of liquids      Patient Education     Viral Upper Respiratory Illness (Adult)    You have a viral upper respiratory illness (URI), which is another term for the common cold. This illness is contagious during the first few days. It is spread through the air by coughing and sneezing. It may also be spread by direct contact (touching the sick person and then touching your own eyes, nose, or mouth). Frequent handwashing will decrease risk of spread. Most viral illnesses go away within 7 to 10 days with rest and simple home remedies. Sometimes the illness may last for several weeks. Antibiotics will not kill a virus, and they are generally not prescribed for this condition.  Home care  · If symptoms are severe, rest at home for the first 2 to 3 days. When you resume activity, don't let yourself get too tired.  · Don't smoke. If you need help stopping, talk with your healthcare provider.  · Avoid being exposed to cigarette smoke (yours or others’).  · You may use acetaminophen or ibuprofen to control pain and fever, unless another medicine was prescribed. If you have chronic liver or kidney disease, have ever had a stomach ulcer or gastrointestinal bleeding, or are taking blood-thinning medicines, talk with your healthcare provider before using these medicines. Aspirin should never be given to anyone under 18 years of age who is ill with a viral infection or fever. It may cause severe liver or brain damage.  · Your appetite may be poor, so a light diet is fine. Stay well hydrated by drinking 6 to 8 glasses of fluids per day (water, soft drinks, juices, tea, or soup). Extra fluids will help loosen secretions in the nose and lungs.  · Over-the-counter cold medicines will not  shorten the length of time you’re sick, but they may be helpful for the following symptoms: cough, sore throat, and nasal and sinus congestion. If you take prescription medicines, ask your healthcare provider or pharmacist which over-the-counter medicines are safe to use. (Note: Don't use decongestants if you have high blood pressure.)  Follow-up care  Follow up with your healthcare provider, or as advised.  When to seek medical advice  Call your healthcare provider right away if any of these occur:  · Cough with lots of colored sputum (mucus)  · Severe headache; face, neck, or ear pain  · Difficulty swallowing due to throat pain  · Fever of 100.4°F (38°C) or higher, or as directed by your healthcare provider  Call 911  Call 911 if any of these occur:  · Chest pain, shortness of breath, wheezing, or difficulty breathing  · Coughing up blood  · Very severe pain with swallowing, especially if it goes along with a muffled voice   Phoebe last reviewed this educational content on 6/1/2018 © 2000-2021 [employer]. All rights reserved. This information is not intended as a substitute for professional medical care. Always follow your healthcare professional's instructions.            Pt wife did not want to sta

## 2021-08-26 NOTE — PHYSICAL THERAPY INITIAL EVALUATION ADULT - PHYSICAL ASSIST/NONPHYSICAL ASSIST: STAND/SIT, REHAB EVAL
2 person assist/verbal cues Staged Advancement Flap Text: The defect edges were debeveled with a #15 scalpel blade.  Given the location of the defect, shape of the defect and the proximity to free margins a staged advancement flap was deemed most appropriate.  Using a sterile surgical marker, an appropriate advancement flap was drawn incorporating the defect and placing the expected incisions within the relaxed skin tension lines where possible. The area thus outlined was incised deep to adipose tissue with a #15 scalpel blade.  The skin margins were undermined to an appropriate distance in all directions utilizing iris scissors.

## 2023-05-03 NOTE — PHYSICAL THERAPY INITIAL EVALUATION ADULT - ACTIVE RANGE OF MOTION EXAMINATION, REHAB EVAL
.   bilateral upper extremity Active ROM was WFL (within functional limits)/bilateral  lower extremity Active ROM was WFL (within functional limits)/actvie assisted ROM performed

## 2023-08-19 NOTE — PATIENT PROFILE ADULT - NSPROPTRIGHTSUPPORTPERSON_GEN_A_NUR
Pt c/o R shoulder pain starting 2 days ago, pt denies injury but have a cough prior, pt tested neg for covid same name as above

## 2023-09-28 NOTE — ED ADULT NURSE NOTE - CHIEF COMPLAINT
C3 nurse attempted to contact Genoveva Gilbert  for a T[facility] post hospital discharge follow up call. No answer. No voicemail available.The patient does not have a scheduled HOSFU appointment. Message sent to PCP staff for assistance with scheduling visit with patient.         The patient is a 78y Male complaining of

## 2024-02-13 NOTE — PATIENT PROFILE ADULT - NSPROMEDSBROUGHTTOHOSP_GEN_A_NUR
`Outreach attempts to coordinate scheduling on the patient's Service to Gastroenterology order requested on 2/2/2024 have been conducted and  been removed as, unable to contact. Ordering provider has been notified.       Please contact Patient if further coordination is needed.  
no

## 2024-10-21 NOTE — OCCUPATIONAL THERAPY INITIAL EVALUATION ADULT - PATIENT/FAMILY/SIGNIFICANT OTHER GOALS STATEMENT, OT EVAL
Patient called to check the status of his forms that were dropped off. As per office the forms are ready for . Pt hung up prior to me relaying message. Office states they will contact pt back to discuss further .  
Pt unable to verbalize goals.
